# Patient Record
Sex: MALE | Race: WHITE | NOT HISPANIC OR LATINO | Employment: FULL TIME | URBAN - METROPOLITAN AREA
[De-identification: names, ages, dates, MRNs, and addresses within clinical notes are randomized per-mention and may not be internally consistent; named-entity substitution may affect disease eponyms.]

---

## 2017-05-31 ENCOUNTER — OFFICE VISIT (OUTPATIENT)
Dept: FAMILY MEDICINE | Facility: CLINIC | Age: 27
End: 2017-05-31
Payer: COMMERCIAL

## 2017-05-31 VITALS
OXYGEN SATURATION: 97 % | BODY MASS INDEX: 30.27 KG/M2 | TEMPERATURE: 99 F | SYSTOLIC BLOOD PRESSURE: 128 MMHG | HEART RATE: 76 BPM | HEIGHT: 71 IN | DIASTOLIC BLOOD PRESSURE: 82 MMHG | WEIGHT: 216.19 LBS

## 2017-05-31 DIAGNOSIS — F98.8 ATTENTION DEFICIT DISORDER: Primary | ICD-10-CM

## 2017-05-31 PROCEDURE — 99999 PR PBB SHADOW E&M-EST. PATIENT-LVL IV: CPT | Mod: PBBFAC,,, | Performed by: NURSE PRACTITIONER

## 2017-05-31 PROCEDURE — 99213 OFFICE O/P EST LOW 20 MIN: CPT | Mod: S$GLB,,, | Performed by: NURSE PRACTITIONER

## 2017-05-31 RX ORDER — DEXTROAMPHETAMINE SACCHARATE, AMPHETAMINE ASPARTATE MONOHYDRATE, DEXTROAMPHETAMINE SULFATE AND AMPHETAMINE SULFATE 5; 5; 5; 5 MG/1; MG/1; MG/1; MG/1
20 CAPSULE, EXTENDED RELEASE ORAL EVERY MORNING
Qty: 30 CAPSULE | Refills: 0 | Status: SHIPPED | OUTPATIENT
Start: 2017-07-30 | End: 2017-07-11

## 2017-05-31 RX ORDER — DEXTROAMPHETAMINE SACCHARATE, AMPHETAMINE ASPARTATE, DEXTROAMPHETAMINE SULFATE AND AMPHETAMINE SULFATE 5; 5; 5; 5 MG/1; MG/1; MG/1; MG/1
1 TABLET ORAL DAILY
Qty: 30 TABLET | Refills: 0 | Status: SHIPPED | OUTPATIENT
Start: 2017-05-31 | End: 2017-05-31 | Stop reason: SDUPTHER

## 2017-05-31 RX ORDER — DEXTROAMPHETAMINE SACCHARATE, AMPHETAMINE ASPARTATE MONOHYDRATE, DEXTROAMPHETAMINE SULFATE AND AMPHETAMINE SULFATE 5; 5; 5; 5 MG/1; MG/1; MG/1; MG/1
20 CAPSULE, EXTENDED RELEASE ORAL EVERY MORNING
Qty: 30 CAPSULE | Refills: 0 | Status: SHIPPED | OUTPATIENT
Start: 2017-06-30 | End: 2017-05-31 | Stop reason: SDUPTHER

## 2017-05-31 RX ORDER — DEXTROAMPHETAMINE SACCHARATE, AMPHETAMINE ASPARTATE, DEXTROAMPHETAMINE SULFATE AND AMPHETAMINE SULFATE 5; 5; 5; 5 MG/1; MG/1; MG/1; MG/1
1 TABLET ORAL DAILY
Qty: 30 TABLET | Refills: 0 | Status: SHIPPED | OUTPATIENT
Start: 2017-07-30 | End: 2017-07-11

## 2017-05-31 RX ORDER — DEXTROAMPHETAMINE SACCHARATE, AMPHETAMINE ASPARTATE, DEXTROAMPHETAMINE SULFATE AND AMPHETAMINE SULFATE 5; 5; 5; 5 MG/1; MG/1; MG/1; MG/1
1 TABLET ORAL DAILY
Qty: 30 TABLET | Refills: 0 | Status: SHIPPED | OUTPATIENT
Start: 2017-06-30 | End: 2017-05-31 | Stop reason: SDUPTHER

## 2017-05-31 RX ORDER — DEXTROAMPHETAMINE SACCHARATE, AMPHETAMINE ASPARTATE MONOHYDRATE, DEXTROAMPHETAMINE SULFATE AND AMPHETAMINE SULFATE 5; 5; 5; 5 MG/1; MG/1; MG/1; MG/1
20 CAPSULE, EXTENDED RELEASE ORAL EVERY MORNING
Qty: 30 CAPSULE | Refills: 0 | Status: SHIPPED | OUTPATIENT
Start: 2017-05-31 | End: 2017-05-31 | Stop reason: SDUPTHER

## 2017-05-31 NOTE — PROGRESS NOTES
Subjective:       Patient ID: Obed Gardner is a 27 y.o. male.    Chief Complaint: Medication Refill  Pt reports to clinic for medication refill on ADHD medication.  Reports he has not taken medication in approx 1 year due to loss of health insurance.  Notes severe difficulty with concentration.  Starts tasks and can't finish.  Denies having palpitations, insomnia or anorexia while on medication  HPI  Review of Systems   Constitutional: Negative.    HENT: Negative.    Respiratory: Negative.    Cardiovascular: Negative.    Gastrointestinal: Negative.    Genitourinary: Negative.    Musculoskeletal: Negative.    Neurological: Negative.    Psychiatric/Behavioral: Positive for decreased concentration.       Objective:      Physical Exam   Constitutional: He is oriented to person, place, and time. He appears well-developed and well-nourished.   HENT:   Head: Normocephalic.   Eyes: EOM are normal.   Neck: Neck supple.   Cardiovascular: Normal rate and normal heart sounds.    Pulmonary/Chest: Effort normal and breath sounds normal.   Abdominal: Soft. Bowel sounds are normal.   Musculoskeletal: Normal range of motion.   Neurological: He is alert and oriented to person, place, and time.   Skin: Skin is warm and dry.   Psychiatric: He has a normal mood and affect.   Vitals reviewed.      Assessment:       1. Attention deficit disorder        Plan:   Attention deficit disorder  -     Discontinue: dextroamphetamine-amphetamine (ADDERALL) 20 mg tablet; Take 1 tablet by mouth once daily.  Dispense: 30 tablet; Refill: 0  -     Discontinue: dextroamphetamine-amphetamine (ADDERALL XR) 20 MG 24 hr capsule; Take 1 capsule (20 mg total) by mouth every morning.  Dispense: 30 capsule; Refill: 0  -     Discontinue: dextroamphetamine-amphetamine (ADDERALL) 20 mg tablet; Take 1 tablet by mouth once daily.  Dispense: 30 tablet; Refill: 0  -     Discontinue: dextroamphetamine-amphetamine (ADDERALL XR) 20 MG 24 hr capsule; Take 1  capsule (20 mg total) by mouth every morning.  Dispense: 30 capsule; Refill: 0  -     dextroamphetamine-amphetamine (ADDERALL) 20 mg tablet; Take 1 tablet by mouth once daily.  Dispense: 30 tablet; Refill: 0  -     dextroamphetamine-amphetamine (ADDERALL XR) 20 MG 24 hr capsule; Take 1 capsule (20 mg total) by mouth every morning.  Dispense: 30 capsule; Refill: 0  -stable. Follow up in 3 months    No Follow-up on file.

## 2017-07-11 ENCOUNTER — HOSPITAL ENCOUNTER (EMERGENCY)
Facility: HOSPITAL | Age: 27
End: 2017-07-12
Attending: EMERGENCY MEDICINE
Payer: COMMERCIAL

## 2017-07-11 DIAGNOSIS — R45.851 SUICIDAL IDEATION: Primary | ICD-10-CM

## 2017-07-11 DIAGNOSIS — F14.90 COCAINE USE: ICD-10-CM

## 2017-07-11 DIAGNOSIS — F12.20 CANNABIS DEPENDENCE: ICD-10-CM

## 2017-07-11 DIAGNOSIS — F33.9 MAJOR DEPRESSIVE DISORDER, RECURRENT, UNSPECIFIED: ICD-10-CM

## 2017-07-11 DIAGNOSIS — F11.20 OPIOID USE DISORDER, SEVERE, DEPENDENCE: ICD-10-CM

## 2017-07-11 LAB
ALBUMIN SERPL BCP-MCNC: 4 G/DL
ALP SERPL-CCNC: 90 U/L
ALT SERPL W/O P-5'-P-CCNC: 21 U/L
ANION GAP SERPL CALC-SCNC: 9 MMOL/L
APAP SERPL-MCNC: <3 UG/ML
AST SERPL-CCNC: 21 U/L
BASOPHILS # BLD AUTO: 0 K/UL
BASOPHILS NFR BLD: 0 %
BILIRUB SERPL-MCNC: 0.6 MG/DL
BUN SERPL-MCNC: 10 MG/DL
CALCIUM SERPL-MCNC: 9.4 MG/DL
CHLORIDE SERPL-SCNC: 110 MMOL/L
CO2 SERPL-SCNC: 24 MMOL/L
CREAT SERPL-MCNC: 1 MG/DL
DIFFERENTIAL METHOD: ABNORMAL
EOSINOPHIL # BLD AUTO: 0 K/UL
EOSINOPHIL NFR BLD: 0.3 %
ERYTHROCYTE [DISTWIDTH] IN BLOOD BY AUTOMATED COUNT: 12.2 %
EST. GFR  (AFRICAN AMERICAN): >60 ML/MIN/1.73 M^2
EST. GFR  (NON AFRICAN AMERICAN): >60 ML/MIN/1.73 M^2
ETHANOL SERPL-MCNC: <10 MG/DL
GLUCOSE SERPL-MCNC: 148 MG/DL
HCT VFR BLD AUTO: 44.5 %
HGB BLD-MCNC: 16.2 G/DL
LYMPHOCYTES # BLD AUTO: 1.6 K/UL
LYMPHOCYTES NFR BLD: 24 %
MCH RBC QN AUTO: 32.1 PG
MCHC RBC AUTO-ENTMCNC: 36.4 %
MCV RBC AUTO: 88 FL
MONOCYTES # BLD AUTO: 0.4 K/UL
MONOCYTES NFR BLD: 5.7 %
NEUTROPHILS # BLD AUTO: 4.7 K/UL
NEUTROPHILS NFR BLD: 70 %
PLATELET # BLD AUTO: 224 K/UL
PMV BLD AUTO: 9.5 FL
POTASSIUM SERPL-SCNC: 3.7 MMOL/L
PROT SERPL-MCNC: 7.7 G/DL
RBC # BLD AUTO: 5.04 M/UL
SALICYLATES SERPL-MCNC: <5 MG/DL
SODIUM SERPL-SCNC: 143 MMOL/L
TSH SERPL DL<=0.005 MIU/L-ACNC: 0.76 UIU/ML
WBC # BLD AUTO: 6.7 K/UL

## 2017-07-11 PROCEDURE — 85025 COMPLETE CBC W/AUTO DIFF WBC: CPT

## 2017-07-11 PROCEDURE — 80053 COMPREHEN METABOLIC PANEL: CPT

## 2017-07-11 PROCEDURE — 84443 ASSAY THYROID STIM HORMONE: CPT

## 2017-07-11 PROCEDURE — 99242 OFF/OP CONSLTJ NEW/EST SF 20: CPT | Mod: GT,,, | Performed by: PSYCHIATRY & NEUROLOGY

## 2017-07-11 PROCEDURE — 80329 ANALGESICS NON-OPIOID 1 OR 2: CPT

## 2017-07-11 PROCEDURE — 80320 DRUG SCREEN QUANTALCOHOLS: CPT

## 2017-07-11 PROCEDURE — 99285 EMERGENCY DEPT VISIT HI MDM: CPT

## 2017-07-11 PROCEDURE — 80307 DRUG TEST PRSMV CHEM ANLYZR: CPT

## 2017-07-11 RX ORDER — PROMETHAZINE HYDROCHLORIDE 25 MG/1
25 TABLET ORAL EVERY 4 HOURS PRN
Status: DISCONTINUED | OUTPATIENT
Start: 2017-07-12 | End: 2017-07-12

## 2017-07-11 RX ORDER — KETOROLAC TROMETHAMINE 10 MG/1
10 TABLET, FILM COATED ORAL EVERY 6 HOURS PRN
Status: DISCONTINUED | OUTPATIENT
Start: 2017-07-12 | End: 2017-07-12 | Stop reason: HOSPADM

## 2017-07-11 RX ORDER — LOPERAMIDE HYDROCHLORIDE 2 MG/1
2 CAPSULE ORAL 4 TIMES DAILY PRN
Status: DISCONTINUED | OUTPATIENT
Start: 2017-07-12 | End: 2017-07-12 | Stop reason: HOSPADM

## 2017-07-11 RX ADMIN — KETOROLAC TROMETHAMINE 10 MG: 10 TABLET, FILM COATED ORAL at 11:07

## 2017-07-12 VITALS
BODY MASS INDEX: 28 KG/M2 | SYSTOLIC BLOOD PRESSURE: 126 MMHG | DIASTOLIC BLOOD PRESSURE: 80 MMHG | TEMPERATURE: 98 F | HEIGHT: 71 IN | OXYGEN SATURATION: 99 % | RESPIRATION RATE: 18 BRPM | WEIGHT: 200 LBS | HEART RATE: 71 BPM

## 2017-07-12 LAB
AMPHET+METHAMPHET UR QL: NEGATIVE
BARBITURATES UR QL SCN>200 NG/ML: NEGATIVE
BENZODIAZ UR QL SCN>200 NG/ML: NEGATIVE
BILIRUB UR QL STRIP: ABNORMAL
BZE UR QL SCN: NORMAL
CANNABINOIDS UR QL SCN: NORMAL
CLARITY UR: CLEAR
COLOR UR: YELLOW
CREAT UR-MCNC: 363.3 MG/DL
GLUCOSE UR QL STRIP: NEGATIVE
HGB UR QL STRIP: NEGATIVE
KETONES UR QL STRIP: ABNORMAL
LEUKOCYTE ESTERASE UR QL STRIP: NEGATIVE
METHADONE UR QL SCN>300 NG/ML: NEGATIVE
NITRITE UR QL STRIP: NEGATIVE
OPIATES UR QL SCN: NEGATIVE
PCP UR QL SCN>25 NG/ML: NEGATIVE
PH UR STRIP: 6 [PH] (ref 5–8)
PROT UR QL STRIP: ABNORMAL
SP GR UR STRIP: 1.02 (ref 1–1.03)
TOXICOLOGY INFORMATION: NORMAL
URN SPEC COLLECT METH UR: ABNORMAL
UROBILINOGEN UR STRIP-ACNC: 1 EU/DL

## 2017-07-12 PROCEDURE — 81003 URINALYSIS AUTO W/O SCOPE: CPT

## 2017-07-12 PROCEDURE — 25000003 PHARM REV CODE 250: Performed by: EMERGENCY MEDICINE

## 2017-07-12 PROCEDURE — 80307 DRUG TEST PRSMV CHEM ANLYZR: CPT

## 2017-07-12 RX ORDER — DICYCLOMINE HYDROCHLORIDE 10 MG/1
10 CAPSULE ORAL EVERY 6 HOURS PRN
Status: DISCONTINUED | OUTPATIENT
Start: 2017-07-12 | End: 2017-07-12 | Stop reason: HOSPADM

## 2017-07-12 RX ORDER — ZOLPIDEM TARTRATE 5 MG/1
10 TABLET ORAL NIGHTLY PRN
Status: DISCONTINUED | OUTPATIENT
Start: 2017-07-12 | End: 2017-07-12

## 2017-07-12 RX ORDER — TRAZODONE HYDROCHLORIDE 50 MG/1
50 TABLET ORAL NIGHTLY PRN
Status: DISCONTINUED | OUTPATIENT
Start: 2017-07-12 | End: 2017-07-12 | Stop reason: HOSPADM

## 2017-07-12 RX ORDER — CLONIDINE 0.2 MG/24H
1 PATCH, EXTENDED RELEASE TRANSDERMAL
Status: DISCONTINUED | OUTPATIENT
Start: 2017-07-12 | End: 2017-07-12 | Stop reason: HOSPADM

## 2017-07-12 RX ORDER — PROMETHAZINE HYDROCHLORIDE 25 MG/1
25 TABLET ORAL EVERY 4 HOURS PRN
Status: DISCONTINUED | OUTPATIENT
Start: 2017-07-12 | End: 2017-07-12 | Stop reason: HOSPADM

## 2017-07-12 RX ORDER — BACLOFEN 10 MG/1
10 TABLET ORAL 3 TIMES DAILY PRN
Status: DISCONTINUED | OUTPATIENT
Start: 2017-07-12 | End: 2017-07-12 | Stop reason: HOSPADM

## 2017-07-12 RX ORDER — GABAPENTIN 300 MG/1
300 CAPSULE ORAL 3 TIMES DAILY
Status: DISCONTINUED | OUTPATIENT
Start: 2017-07-12 | End: 2017-07-12 | Stop reason: HOSPADM

## 2017-07-12 RX ORDER — HYDROXYZINE PAMOATE 25 MG/1
25 CAPSULE ORAL EVERY 6 HOURS PRN
Status: DISCONTINUED | OUTPATIENT
Start: 2017-07-12 | End: 2017-07-12 | Stop reason: HOSPADM

## 2017-07-12 RX ORDER — ONDANSETRON 4 MG/1
4 TABLET, ORALLY DISINTEGRATING ORAL EVERY 6 HOURS PRN
Status: DISCONTINUED | OUTPATIENT
Start: 2017-07-12 | End: 2017-07-12 | Stop reason: HOSPADM

## 2017-07-12 RX ADMIN — ZOLPIDEM TARTRATE 10 MG: 5 TABLET, FILM COATED ORAL at 01:07

## 2017-07-12 RX ADMIN — CLONIDINE 1 PATCH: 0.2 PATCH TRANSDERMAL at 05:07

## 2017-07-12 RX ADMIN — HYDROXYZINE PAMOATE 25 MG: 25 CAPSULE ORAL at 03:07

## 2017-07-12 RX ADMIN — GABAPENTIN 300 MG: 300 CAPSULE ORAL at 05:07

## 2017-07-12 NOTE — ED PROVIDER NOTES
SCRIBE #1 NOTE: I, Dalia Rodriguez, am scribing for, and in the presence of, Laura De La Rosa DO. I have scribed the entire note.      History      Chief Complaint   Patient presents with    Suicide Attempt     reports SI x 1 month and suicide attempt today by hanging himself        Review of patient's allergies indicates:  No Known Allergies     HPI   HPI    7/11/2017, 10:59 PM   History obtained from the patient      History of Present Illness: Obed Gardner is a 27 y.o. male patient who presents to the Emergency Department for suicidal ideation which onset gradually 1 month ago. Symptoms are intermittent and moderate in severity. Pt reports that he abuses oxycodone daily. Pt states that whenever he doesn't have his pain medicine he starts to feel depressed. He notes that he takes about 60 mg a day. Pt states that he also used marijuana and cocaine yesterday. Pt reports that he ran out yesterday, and began to feel depressed this morning. Pt states that around 1 PM he attempted to hang himself with an extension cord. Pt reports that he did not step down from the stool he was standing on. Pt denies any trouble swallowing or neck pain at this time. No mitigating or exacerbating factors reported.  Patient denies any homicidal ideation, sleep disturbance, EtOH use, IV drug use, SOB, CP,  and all other sxs at this time. No further complaints or concerns at this time.         Arrival mode: Personal vehicle    PCP: Primary Doctor No       Past Medical History:  Past Medical History:   Diagnosis Date    ADHD        Past Surgical History:  History reviewed. No pertinent surgical history.      Family History:  History reviewed. No pertinent family history.    Social History:  Social History     Social History Main Topics    Smoking status: Current Every Day Smoker     Packs/day: 2.00    Smokeless tobacco: Never Used    Alcohol use No    Drug use:      Types: Cocaine, Marijuana      Comment: opiates    Sexual  activity: Yes     Partners: Female       ROS   Review of Systems   Constitutional: Negative for fever.   HENT: Negative for sore throat.    Respiratory: Negative for shortness of breath.    Cardiovascular: Negative for chest pain.   Gastrointestinal: Negative for nausea.   Genitourinary: Negative for dysuria.   Musculoskeletal: Negative for back pain.   Skin: Negative for rash.   Neurological: Negative for weakness.   Hematological: Does not bruise/bleed easily.   Psychiatric/Behavioral: Positive for suicidal ideas. Negative for agitation, confusion and sleep disturbance.       Physical Exam      Initial Vitals [07/11/17 2138]   BP Pulse Resp Temp SpO2   (!) 160/106 102 20 98.1 °F (36.7 °C) 100 %      MAP       124          Physical Exam  Nursing Notes and Vital Signs Reviewed.  Constitutional: Patient is in no apparent distress. Well-developed and well-nourished.  Head: Atraumatic. Normocephalic.  Eyes: PERRL. EOM intact. Conjunctivae are not pale. No scleral icterus.  ENT: Mucous membranes are moist. Oropharynx is clear and symmetric.    Neck: Supple. Full ROM. No lymphadenopathy.  Cardiovascular: Regular rate. Regular rhythm. No murmurs, rubs, or gallops. Distal pulses are 2+ and symmetric.  Pulmonary/Chest: No respiratory distress. Clear to auscultation bilaterally. No wheezing, rales, or rhonchi.  Abdominal: Soft and non-distended.  There is no tenderness.  No rebound, guarding, or rigidity. Good bowel sounds.  Genitourinary: No CVA tenderness  Musculoskeletal: Moves all extremities. No obvious deformities. No edema. No calf tenderness.  Skin: Warm and dry.  Neurological:  Alert, awake, and appropriate.  Normal speech.  No acute focal neurological deficits are appreciated.  Psychiatric:               Behavior: normal, cooperative              Mood and Affect: flat affect              Thought Process: within normal limits              Suicidal Ideations: No              Suicidal Plan: Specific plan to harm self.  " Hanging.               Homicidal Ideations: No              Hallucinations: none      ED Course    Procedures  ED Vital Signs:  Vitals:    07/11/17 2138 07/11/17 2310 07/12/17 0021 07/12/17 0217   BP: (!) 160/106 (!) 139/90 (!) 149/95 122/74   Pulse: 102 83 94 81   Resp: 20 18 18 18   Temp: 98.1 °F (36.7 °C) 98.1 °F (36.7 °C) 98.3 °F (36.8 °C) 97.9 °F (36.6 °C)   TempSrc: Oral Oral Oral Oral   SpO2: 100% 99% 95% 98%   Weight: 90.7 kg (200 lb)      Height: 5' 11" (1.803 m)          Abnormal Lab Results:  Labs Reviewed   CBC W/ AUTO DIFFERENTIAL - Abnormal; Notable for the following:        Result Value    MCH 32.1 (*)     MCHC 36.4 (*)     All other components within normal limits   COMPREHENSIVE METABOLIC PANEL - Abnormal; Notable for the following:     Glucose 148 (*)     All other components within normal limits   URINALYSIS - Abnormal; Notable for the following:     Protein, UA Trace (*)     Ketones, UA Trace (*)     Bilirubin (UA) 1+ (*)     All other components within normal limits   ACETAMINOPHEN LEVEL - Abnormal; Notable for the following:     Acetaminophen (Tylenol), Serum <3.0 (*)     All other components within normal limits   SALICYLATE LEVEL - Abnormal; Notable for the following:     Salicylate Lvl <5.0 (*)     All other components within normal limits   TSH   DRUG SCREEN PANEL, URINE EMERGENCY   ALCOHOL,MEDICAL (ETHANOL)        All Lab Results:  Results for orders placed or performed during the hospital encounter of 07/11/17   CBC auto differential   Result Value Ref Range    WBC 6.70 3.90 - 12.70 K/uL    RBC 5.04 4.60 - 6.20 M/uL    Hemoglobin 16.2 14.0 - 18.0 g/dL    Hematocrit 44.5 40.0 - 54.0 %    MCV 88 82 - 98 fL    MCH 32.1 (H) 27.0 - 31.0 pg    MCHC 36.4 (H) 32.0 - 36.0 %    RDW 12.2 11.5 - 14.5 %    Platelets 224 150 - 350 K/uL    MPV 9.5 9.2 - 12.9 fL    Gran # 4.7 1.8 - 7.7 K/uL    Lymph # 1.6 1.0 - 4.8 K/uL    Mono # 0.4 0.3 - 1.0 K/uL    Eos # 0.0 0.0 - 0.5 K/uL    Baso # 0.00 0.00 - 0.20 " K/uL    Gran% 70.0 38.0 - 73.0 %    Lymph% 24.0 18.0 - 48.0 %    Mono% 5.7 4.0 - 15.0 %    Eosinophil% 0.3 0.0 - 8.0 %    Basophil% 0.0 0.0 - 1.9 %    Differential Method Automated    Comprehensive metabolic panel   Result Value Ref Range    Sodium 143 136 - 145 mmol/L    Potassium 3.7 3.5 - 5.1 mmol/L    Chloride 110 95 - 110 mmol/L    CO2 24 23 - 29 mmol/L    Glucose 148 (H) 70 - 110 mg/dL    BUN, Bld 10 6 - 20 mg/dL    Creatinine 1.0 0.5 - 1.4 mg/dL    Calcium 9.4 8.7 - 10.5 mg/dL    Total Protein 7.7 6.0 - 8.4 g/dL    Albumin 4.0 3.5 - 5.2 g/dL    Total Bilirubin 0.6 0.1 - 1.0 mg/dL    Alkaline Phosphatase 90 55 - 135 U/L    AST 21 10 - 40 U/L    ALT 21 10 - 44 U/L    Anion Gap 9 8 - 16 mmol/L    eGFR if African American >60 >60 mL/min/1.73 m^2    eGFR if non African American >60 >60 mL/min/1.73 m^2   TSH   Result Value Ref Range    TSH 0.758 0.400 - 4.000 uIU/mL   Urinalysis - clean catch   Result Value Ref Range    Specimen UA Urine, Clean Catch     Color, UA Yellow Yellow, Straw, Kathleen    Appearance, UA Clear Clear    pH, UA 6.0 5.0 - 8.0    Specific Gravity, UA 1.025 1.005 - 1.030    Protein, UA Trace (A) Negative    Glucose, UA Negative Negative    Ketones, UA Trace (A) Negative    Bilirubin (UA) 1+ (A) Negative    Occult Blood UA Negative Negative    Nitrite, UA Negative Negative    Urobilinogen, UA 1.0 <2.0 EU/dL    Leukocytes, UA Negative Negative   Drug screen panel, emergency   Result Value Ref Range    Benzodiazepines Negative     Methadone metabolites Negative     Cocaine (Metab.) Presumptive Positive     Opiate Scrn, Ur Negative     Barbiturate Screen, Ur Negative     Amphetamine Screen, Ur Negative     THC Presumptive Positive     Phencyclidine Negative     Creatinine, Random Ur 363.3 23.0 - 375.0 mg/dL    Toxicology Information SEE COMMENT    Ethanol   Result Value Ref Range    Alcohol, Medical, Serum <10 <10 mg/dL   Acetaminophen level   Result Value Ref Range    Acetaminophen (Tylenol), Serum  <3.0 (L) 10.0 - 20.0 ug/mL   Salicylate level   Result Value Ref Range    Salicylate Lvl <5.0 (L) 15.0 - 30.0 mg/dL         Imaging Results:  Imaging Results    None                 The Emergency Provider reviewed the vital signs and test results, which are outlined above.    ED Discussion       10:45 PM: The PEC hold has been issued by Dr. Cortez at this time for suicide attempt and suicidal ideation.    2:59 AM: Pt has been medically cleared by Dr. De La Rosa at this time. Reassessed pt at this time. Pt is resting comfortably and appears in no acute distress. There are no psychiatric services offered at this facility. Patient requests inpatient psychiatric services. D/w pt all pertinent ED information and plan to transfer to psychiatric facility for psychiatric treatment. Pt verbalizes understanding. Patient being transferred by [facility] for ongoing personal protection en route. Pt will be transported by personnel trained in CPR and CPI.  Risks:  MVC, loss of vitals, resulting in death, or permanent disability.   Benefits:  Inpatient psychiatric care. All questions and complaints have been addressed at this time. Pt condition is stable at this time and is clear to transfer to psychiatric facility at this time. Patient verbalized understanding.     3:09 AM   Accepting Facility: [facility]   Accepting Physician: Dr. Finney        ED Medication(s):  Medications   ketorolac tablet 10 mg (10 mg Oral Given 7/11/17 1891)   loperamide capsule 2 mg (not administered)   cloNIDine 0.2 mg/24 hr td ptwk 1 patch (not administered)   dicyclomine capsule 10 mg (not administered)   ondansetron disintegrating tablet 4 mg (not administered)   baclofen tablet 10 mg (not administered)   hydrOXYzine pamoate capsule 25 mg (not administered)   trazodone tablet 50 mg (not administered)   gabapentin capsule 300 mg (not administered)   promethazine tablet 25 mg (not administered)       New Prescriptions    No medications on file              Medical Decision Making              Scribe Attestation:   Scribe #1: I performed the above scribed service and the documentation accurately describes the services I performed. I attest to the accuracy of the note.    Attending:   Physician Attestation Statement for Scribe #1: I, Laura De La Rosa DO, personally performed the services described in this documentation, as scribed by Dalia Rodriguez, in my presence, and it is both accurate and complete.          Clinical Impression       ICD-10-CM ICD-9-CM   1. Suicidal ideation R45.851 V62.84   2. Major depressive disorder, recurrent, unspecified F33.9 296.30   3. Opioid use disorder, severe, dependence F11.20 304.00   4. Cannabis dependence F12.20 304.30   5. Cocaine use F14.10 305.60       Disposition:   Disposition: Transferred  Condition: Stable         Laura De La Rosa DO  07/12/17 0422

## 2017-07-12 NOTE — ED NOTES
BELONGINGS IN XRAY ROOM LOCKER 1  Suitcase with 4 packs of cigarettes, 3 pair of underwear, 4 pair of shorts, 3 pair of socks, 3 shirts, 1 pair of tennis shoes with no laces, toothpaste, toothbrush, deoderant

## 2017-07-12 NOTE — CONSULTS
"Tele-Consultation to Emergency Department from Psychiatry      Patient agreeable to consultation via telepsychiatry.    Consultation started: 7/11/2017 at 2345  The chief complaint leading to psychiatric consultation is: Suicide attempt, substance abuse  This consultation was requested by Dr. De La Rosa, the Emergency Department attending physician.  The location of the consulting psychiatrist is [facility] [place], [place].  The patient location is [facility].  The patient arrived at the ED at: 2130    Also present with the patient at the time of the consultation: ED Staff    Patient Identification:  Obed Gardner is a 27 y.o. male.    Patient information was obtained from patient.  Patient presented voluntarily to the Emergency Department by private vehicle.    History of Present Illness:  26 y/o WM who presented to the ED after an aborted suicide attempt via hanging.  He had an electrical cord around his neck and states he was standing on the stool.  He endorsed worsening depression over this past month with suicidal ideations.  These symptoms worsened in the context of abusing oxycodone, cocaine, and marijuana.  He has acute decompensation in mood after running out of his opiates yesterday.  He states he uses 60-70mg daily, via snorting.  He develops opiate withdrawal symptoms 1-2 days afterwards.  He currently endorses stomach cramps, and body aches.  He used cocaine after "a long time, only because I was desperate to use something"  He smokes marijuana daily for years.  He c/o poor sleep, helpless/hopeless feelings, poor appetite, and sad mood.  He denies any psychosis or nina.  He states he has been depressed but this is the worst it has ever been.      Psychiatric History:   Hospitalization: No  Medication Trials: Yes, Viibryd and Adderall,  Suboxone  Suicide Attempts: yes, this is his first attempt  Violence: No  Depression: Yes  Nina: No  AH's: No  Delusions: No    Review of " "Systems:  Negative except psychiatric    Past Medical History:   Past Medical History:   Diagnosis Date    ADHD         Seizures: No  Head trauma/l.o.c.: No  Wish to become pregnant[if female of childbearing age]: n/a    Allergies:   Review of patient's allergies indicates:  No Known Allergies    Medications in ER:   Medications   promethazine tablet 25 mg (not administered)   ketorolac tablet 10 mg (10 mg Oral Given 7/11/17 2322)   loperamide capsule 2 mg (not administered)       Medications at home: Was on Adderall but not currently    Substance Abuse History:   Alchohol: Denies  Drug: Opiates, cocaine, THC     Legal History:   Past charges/incarcerations: [place]  Pending charges: Denies    Family Psychiatric History:   States there are family members who committed suicide and have opiate addiction  Social History:  History of Physical/Sexual Abuse: Did not ask  Employment/Disability: Works as  for printing company  Relationship Status/Sexual Orientation: Hetero,   Children: 2 boys  Housing Status: Lives with wife, two sons, and mother in law   History: No       Current Evaluation:     Constitutional  Vitals:  Vitals:    07/11/17 2138 07/11/17 2310   BP: (!) 160/106 (!) 139/90   Pulse: 102 83   Resp: 20 18   Temp: 98.1 °F (36.7 °C) 98.1 °F (36.7 °C)   TempSrc: Oral Oral   SpO2: 100% 99%   Weight: 90.7 kg (200 lb)    Height: 5' 11" (1.803 m)       General:  unremarkable, age appropriate     Musculoskeletal  Muscle Strength/Tone:   moving arms normally   Gait & Station:   sitting on stretcher     Psychiatric  Level of Consciousness: alert  Orientation: oriented to person, place and time  Grooming: in hospital gown  Psychomotor Behavior: no agitation  Speech: normal in rate, rhythm and volume  Language: uses words appropriately  Mood: Depressed  Affect: Congruent  Thought Process: coherent  Associations: intact  Thought Content: No AVH/HI, +SI with attempt, no delusions  Memory: " Intact  Attention: intact to interview  Fund of Knowledge: appears adequate  Insight: Fair  Judgement: Poor    Relevant Elements of Neurological Exam: no abnormality of posture noted    Assessment - Diagnosis - Goals:     Diagnosis/Impression: Major Depression, Unspecified  Opiate Use Disorder, severe, in withdrawal  Cannabis Use Disorder, severe, uncomplicated  Cocaine Use Disorder, mild, episodic    Rec: I agree with placing a PEC on this patient due to recent suicide attempt.  He is in need of inpatient psychiatric admission and is currently a danger to self.  He will benefit from inpatient detox off of opiates as well being monitored for safety.  His depressive symptoms are exacerbated by opiate withdrawal syndrome.  Start patient on COWS (clinical opiate withdrawal scale).  Anti depressant initiation deferred to accepting / treating psychiatrist.  For now if any opiate withdrawal symptoms develop in the context of awaiting placement, consider these  Recommended medications:  1) Clonidine 0.2mg patch or 1mg po tid  2) Dicyclomine 10mg po q4 prn abdominal cramps  3) Ondansetron 4mg SL q6 PRN nausea  4) Baclofen 10mg po tid PRN muscle cramps  5) Vistaril 25mg po q 4 hours prn anxiety  6) Trazodone 50mg po qhs prn sleep  7) Gabapentin 300 mg po tid    Time with patient: 30m    More than 50% of the time was spent counseling/coordinating care    Laboratory Data:   Labs Reviewed   CBC W/ AUTO DIFFERENTIAL - Abnormal; Notable for the following:        Result Value    MCH 32.1 (*)     MCHC 36.4 (*)     All other components within normal limits   COMPREHENSIVE METABOLIC PANEL - Abnormal; Notable for the following:     Glucose 148 (*)     All other components within normal limits   ACETAMINOPHEN LEVEL - Abnormal; Notable for the following:     Acetaminophen (Tylenol), Serum <3.0 (*)     All other components within normal limits   SALICYLATE LEVEL - Abnormal; Notable for the following:     Salicylate Lvl <5.0 (*)     All  other components within normal limits   TSH   ALCOHOL,MEDICAL (ETHANOL)   URINALYSIS   DRUG SCREEN PANEL, URINE EMERGENCY         Consulting clinician was informed of the encounter and consult note.    Consultation ended: 7/11/2017 at **

## 2017-07-12 NOTE — ED NOTES
Spoke with Corinne at [facility] who states she will get an update on transport and call me back in a few minutes.

## 2017-07-12 NOTE — ED NOTES
Acceptance at [facility] [address], [place], [place]. Accepting Dr Finney. Call report to [phone]

## 2017-07-12 NOTE — ED NOTES
Pt states he has been unable to go back to sleep since being awakened and is feeling very anxious. Pt requesting something for anxiety.

## 2017-07-12 NOTE — ED NOTES
Spoke with Logan at [facility] who states he will page psychiatrist at this time.

## 2017-07-12 NOTE — ED NOTES
Pt presents to ED after a suicide attempt by hanging himself. Pt states that he has been having SI for a while but it has increased recently. States that a contributing factor to SI is his drug addiction and relapses. Pt admits to opiate, marijuana, and cocaine use. Pt denies alcohol use.

## 2017-07-12 NOTE — ED NOTES
ACKNOWLEDGMENT OF RIGHTS SIGNED & PLACED ON CHART.     Refuses safe word.    PEC process explained to pt. Educated on visiting hours. Verbalizes understanding of POC. Pt remains in grey gown & yellow non slip socks. Jeremy at bedside for direct observation. NAD noted. Denies any other needs at the time. Will continue to monitor.    Sandra Gardner (mother): [phone]

## 2017-07-18 ENCOUNTER — OFFICE VISIT (OUTPATIENT)
Dept: FAMILY MEDICINE | Facility: CLINIC | Age: 27
End: 2017-07-18
Payer: COMMERCIAL

## 2017-07-18 ENCOUNTER — TELEPHONE (OUTPATIENT)
Dept: FAMILY MEDICINE | Facility: CLINIC | Age: 27
End: 2017-07-18

## 2017-07-18 VITALS
BODY MASS INDEX: 27.24 KG/M2 | HEIGHT: 71 IN | DIASTOLIC BLOOD PRESSURE: 100 MMHG | SYSTOLIC BLOOD PRESSURE: 150 MMHG | TEMPERATURE: 98 F | OXYGEN SATURATION: 99 % | WEIGHT: 194.56 LBS | HEART RATE: 126 BPM

## 2017-07-18 DIAGNOSIS — F32.3 SEVERE SINGLE CURRENT EPISODE OF MAJOR DEPRESSIVE DISORDER, WITH PSYCHOTIC FEATURES: Primary | ICD-10-CM

## 2017-07-18 DIAGNOSIS — F11.10 OPIOID ABUSE: ICD-10-CM

## 2017-07-18 DIAGNOSIS — T14.91XA SUICIDAL BEHAVIOR WITH ATTEMPTED SELF-INJURY: ICD-10-CM

## 2017-07-18 PROCEDURE — 99999 PR PBB SHADOW E&M-EST. PATIENT-LVL III: CPT | Mod: PBBFAC,,, | Performed by: FAMILY MEDICINE

## 2017-07-18 PROCEDURE — 99214 OFFICE O/P EST MOD 30 MIN: CPT | Mod: S$GLB,,, | Performed by: FAMILY MEDICINE

## 2017-07-18 RX ORDER — FLUOXETINE HYDROCHLORIDE 20 MG/1
20 CAPSULE ORAL DAILY
COMMUNITY
End: 2017-09-06

## 2017-07-18 RX ORDER — HYDROXYZINE PAMOATE 50 MG/1
50 CAPSULE ORAL EVERY 8 HOURS PRN
Qty: 20 CAPSULE | Refills: 0 | Status: SHIPPED | OUTPATIENT
Start: 2017-07-18 | End: 2017-09-06

## 2017-07-18 NOTE — PROGRESS NOTES
"Subjective:       Patient ID: Obed Gardner is a 27 y.o. male.    Chief Complaint: Anxiety    27 y old male with hx of opioids abuse, marihuana abuse and depression here for f.u on Hosp due to suicidal attempts .He tried to hang himself with Extension cord but he became afraid of stepping of stool  .  Hospitalized  and [facility] . Started on Prozac . He was under the care of dr Finney. He has renetta taking opioids for  Over 10 y , he had to borrow money  And got overwhelmed when he realized he had bills to pay , he owned money  Etc . He works for a printing company . Lives with wife and children,. Denies any IV drug use. He just used Cocaine day of suicidal attempt  To try to cope . Ready to quit using illegal substances  . He was " detox" at Inpatient psych facility . Will be enrolling  himself  At the [facility] addiction center .   He feels overwhelmed , worried, not sleeping much . No suicidal thoughts       Anxiety   Symptoms include nervous/anxious behavior. Patient reports no confusion, decreased concentration or suicidal ideas.         Review of Systems   Constitutional: Negative.    HENT: Negative.    Respiratory: Negative.    Cardiovascular: Negative.    Gastrointestinal: Negative.    Psychiatric/Behavioral: Positive for sleep disturbance. Negative for agitation, behavioral problems, confusion, decreased concentration, dysphoric mood, hallucinations, self-injury and suicidal ideas. The patient is nervous/anxious.        Objective:      Physical Exam   Constitutional: He is oriented to person, place, and time. He appears well-developed and well-nourished. No distress.   HENT:   Head: Normocephalic and atraumatic.   Right Ear: External ear normal.   Left Ear: External ear normal.   Nose: Nose normal.   Mouth/Throat: No oropharyngeal exudate.   Eyes: Conjunctivae and EOM are normal. Pupils are equal, round, and reactive to light. Right eye exhibits no discharge. Left eye exhibits no " discharge. No scleral icterus.   Neck: Normal range of motion. Neck supple. No JVD present. No tracheal deviation present. No thyromegaly present.   Cardiovascular: Normal rate, regular rhythm, normal heart sounds and intact distal pulses.  Exam reveals no gallop and no friction rub.    No murmur heard.  Pulmonary/Chest: Effort normal and breath sounds normal. No stridor. No respiratory distress. He has no wheezes. He has no rales. He exhibits no tenderness.   Abdominal: Soft. Bowel sounds are normal. He exhibits no distension. There is no tenderness. There is no rebound and no guarding.   Musculoskeletal: Normal range of motion. He exhibits no edema or tenderness.   Lymphadenopathy:     He has no cervical adenopathy.   Neurological: He is alert and oriented to person, place, and time. He has normal reflexes. He displays normal reflexes. No cranial nerve deficit. He exhibits normal muscle tone. Coordination normal.   Skin: Skin is warm and dry. No rash noted. He is not diaphoretic. No erythema. No pallor.   Psychiatric: His speech is normal and behavior is normal. Judgment and thought content normal. His mood appears anxious. He exhibits a depressed mood.       Assessment:      Obed was seen today for anxiety.    Diagnoses and all orders for this visit:    Severe single current episode of major depressive disorder, with psychotic features  -     Ambulatory consult to Psychiatry    Suicidal behavior with attempted self-injury    Opioid abuse    Other orders  -     hydrOXYzine pamoate (VISTARIL) 50 MG Cap; Take 1 capsule (50 mg total) by mouth every 8 (eight) hours as needed.      Plan:   See psych . CASH contact signed . Cont prozac   Will return in 1 w for nurse visit given his elevated BP   recs form hospitalization requested

## 2017-07-20 ENCOUNTER — TELEPHONE (OUTPATIENT)
Dept: FAMILY MEDICINE | Facility: CLINIC | Age: 27
End: 2017-07-20

## 2018-12-28 ENCOUNTER — OFFICE VISIT (OUTPATIENT)
Dept: FAMILY MEDICINE | Facility: CLINIC | Age: 28
End: 2018-12-28
Payer: COMMERCIAL

## 2018-12-28 VITALS
WEIGHT: 217.13 LBS | TEMPERATURE: 97 F | OXYGEN SATURATION: 99 % | HEART RATE: 89 BPM | BODY MASS INDEX: 30.29 KG/M2 | DIASTOLIC BLOOD PRESSURE: 84 MMHG | SYSTOLIC BLOOD PRESSURE: 135 MMHG

## 2018-12-28 DIAGNOSIS — F32.1 CURRENT MODERATE EPISODE OF MAJOR DEPRESSIVE DISORDER WITHOUT PRIOR EPISODE: Primary | ICD-10-CM

## 2018-12-28 DIAGNOSIS — F11.10 OPIOID ABUSE: ICD-10-CM

## 2018-12-28 DIAGNOSIS — F19.10 SUBSTANCE ABUSE: ICD-10-CM

## 2018-12-28 PROCEDURE — 3008F BODY MASS INDEX DOCD: CPT | Mod: CPTII,S$GLB,, | Performed by: FAMILY MEDICINE

## 2018-12-28 PROCEDURE — 99214 OFFICE O/P EST MOD 30 MIN: CPT | Mod: S$GLB,,, | Performed by: FAMILY MEDICINE

## 2018-12-28 PROCEDURE — 99999 PR PBB SHADOW E&M-EST. PATIENT-LVL III: CPT | Mod: PBBFAC,,, | Performed by: FAMILY MEDICINE

## 2018-12-28 NOTE — PROGRESS NOTES
"Subjective:       Patient ID: Obed Gardner is a 28 y.o. male.    Chief Complaint: Depression     28 y old male with hx of illegal drug abuse, depression and add here wanting to resume taking lexapro  . He stopped it 4 y ago .Last heroin use was 4 m ago ( snorted)  , now with racing thoughts , paranoia about  Being fired , wife being unfaithful etc  .  Employed at \[employer]\"" . He  Just completed a 12 w program at  addiction center .  Suicidal thoughts about 1 m ago , not a formal plan , he just did not"want to be here anymore "       Review of Systems   Constitutional: Negative.    HENT: Negative.    Eyes: Negative.    Respiratory: Negative.    Cardiovascular: Negative.    Gastrointestinal: Negative.    Genitourinary: Negative.    Musculoskeletal: Negative.    Skin: Negative.    Hematological: Negative.        Objective:      Physical Exam   Constitutional: He is oriented to person, place, and time. He appears well-developed and well-nourished. No distress.   HENT:   Head: Normocephalic and atraumatic.   Right Ear: External ear normal.   Left Ear: External ear normal.   Nose: Nose normal.   Mouth/Throat: No oropharyngeal exudate.   Eyes: Conjunctivae and EOM are normal. Pupils are equal, round, and reactive to light. Right eye exhibits no discharge. Left eye exhibits no discharge. No scleral icterus.   Neck: Normal range of motion. Neck supple. No JVD present. No tracheal deviation present. No thyromegaly present.   Cardiovascular: Normal rate, regular rhythm, normal heart sounds and intact distal pulses. Exam reveals no gallop and no friction rub.   No murmur heard.  Pulmonary/Chest: Effort normal and breath sounds normal. No stridor. No respiratory distress. He has no wheezes. He has no rales. He exhibits no tenderness.   Abdominal: Soft. Bowel sounds are normal. He exhibits no distension. There is no tenderness. There is no rebound and no guarding.   Musculoskeletal: Normal range of motion. He exhibits no " edema or tenderness.   Lymphadenopathy:     He has no cervical adenopathy.   Neurological: He is alert and oriented to person, place, and time. He has normal reflexes. He displays normal reflexes. No cranial nerve deficit. He exhibits normal muscle tone. Coordination normal.   Skin: Skin is warm and dry. No rash noted. He is not diaphoretic. No erythema. No pallor.   Psychiatric: He has a normal mood and affect. His behavior is normal. Judgment and thought content normal.       Assessment:      Obed was seen today for depression.    Diagnoses and all orders for this visit:    Current moderate episode of major depressive disorder without prior episode  -     Ambulatory consult to Psychiatry    Substance abuse  -     Ambulatory consult to Psychiatry    Opioid abuse      Plan:    Ws discussed.   COPE contact Info was provided   CASH was also signed   He has  Not show up for several psych giancarlo in the past  . Giancarlo schedule for 1/17 with dr Sandoval     Time spent: 25 minutes in face to face discussion concerning diagnosis, prognosis, review of lab and test results, benefits of treatment as well as management of disease, counseling of patient and coordination of care between various health care providers . Greater than half the time spent was used for coordination of care and counseling of patient.

## 2019-01-17 ENCOUNTER — INITIAL CONSULT (OUTPATIENT)
Dept: PSYCHIATRY | Facility: CLINIC | Age: 29
End: 2019-01-17
Attending: FAMILY MEDICINE
Payer: COMMERCIAL

## 2019-01-17 VITALS
WEIGHT: 218.69 LBS | SYSTOLIC BLOOD PRESSURE: 163 MMHG | DIASTOLIC BLOOD PRESSURE: 99 MMHG | BODY MASS INDEX: 30.5 KG/M2 | HEART RATE: 84 BPM

## 2019-01-17 DIAGNOSIS — F41.1 GENERALIZED ANXIETY DISORDER: ICD-10-CM

## 2019-01-17 DIAGNOSIS — F12.11 CANNABIS USE DISORDER, MILD, IN EARLY REMISSION: ICD-10-CM

## 2019-01-17 DIAGNOSIS — F31.9 BIPOLAR AFFECTIVE DISORDER, REMISSION STATUS UNSPECIFIED: Primary | ICD-10-CM

## 2019-01-17 DIAGNOSIS — F11.21 OPIOID DEPENDENCE IN REMISSION: ICD-10-CM

## 2019-01-17 PROCEDURE — 99999 PR PBB SHADOW E&M-EST. PATIENT-LVL II: CPT | Mod: PBBFAC,,, | Performed by: PSYCHIATRY & NEUROLOGY

## 2019-01-17 PROCEDURE — 90792 PR PSYCHIATRIC DIAGNOSTIC EVALUATION W/MEDICAL SERVICES: ICD-10-PCS | Mod: S$GLB,,, | Performed by: PSYCHIATRY & NEUROLOGY

## 2019-01-17 PROCEDURE — 90792 PSYCH DIAG EVAL W/MED SRVCS: CPT | Mod: S$GLB,,, | Performed by: PSYCHIATRY & NEUROLOGY

## 2019-01-17 PROCEDURE — 99999 PR PBB SHADOW E&M-EST. PATIENT-LVL II: ICD-10-PCS | Mod: PBBFAC,,, | Performed by: PSYCHIATRY & NEUROLOGY

## 2019-01-17 NOTE — PROGRESS NOTES
"Outpatient Psychiatry Initial Visit (MD/NP)    1/17/2019    Obed Gardner, a 28 y.o. male, presenting for initial evaluation visit. Met with patient     Reason for Encounter: Consult from Dr. Beach     Chief Complaint: Medication management for "up and down mood" and paranoia        History of Present Illness:   29 yo male with a history of opiate use disorder, currently in early remission.   September 24th last time used: heroin used at the time. He has struggled with addiction for 10 years. Started out with pain pills, saw his brothers taking them, and once tried couldn't stop. He would buy the prescription opiates off the street, and then couldn't get them anymore and then tried heroin, did it about a month. Nearly lost his job, 12 step program [facility] [place] [facility], completed the program. They do not do medication management, they offer counseling. Patient admitted to Smoking Marijuana also in the past. Alcohol is not drug of choice, drinks it only on occassions  Uncle with opiate addiction, committed suicide 2001. Brothers struggled with opiate addiction now they are sober.    Describes that he is up and down, gets things in his head that are not real. Paranoid about people and that they are doing something that would potentially harm him. Feels that people are intentionally doing things to exclude him, and thinks that wife is cheating on him. Very suspicious about people, but only when he is in a certain "mood." Mood a lot of racing negative thoughts: people don't want to be around me, suspicious of others."    He says that if he works every day usually doesn't get sad.     Depression Symptoms: When he gets depressed he says that it feels its really dark, starts having passive SI,     1)Depressed mood most of the day, nearly every day: "I can be fine in the morning and then down, lasts a couple of hours, usually when he is not busy it gets worse.   2) Markedly diminished interest or " "pleasure: yes  3) Significant weight loss when not dieting or weight gain or decrease or increase in appetite nearly every day: decreased appetite  4) Insomnia or hypersomnia nearly every day: both  5) Psychomotor agitation or retardation nearly: varies   6) Fatigue or loss of energy nearly every day: fatigue   7) Feelings of worthlessness or excessive or inappropriate guilt: yes   8) Diminished ability to think or concentrate, or indecisiveness, nearly every day: yes    9) Recurrent thoughts of death (not just fear of dying), recurrent suicidal ideation without a specific plan, or a suicide attempt or a specific plan for committing suicide: yes no plan      Anxiety Symptoms:  Anxiety Disorder Symptoms:      Excessive Anxiety & Worry (occurring more days than not for at least past 6 months) yes   Difficulty in Controlling Worry -- yes  Sleep disturbance -- yes   Restlessness/psychomotor agitation -- yes  Fatigues easily -- no   Difficulty concentrating/mind going blank -- yes  Irritability -- yes  Muscle tension/headaches -- yes  GI somatic complaints (e.g., IBS, frequent dyspepsia) -- yes     Panic Attack symptoms:  ?Sensations of shortness of breath or smothering: historically   ?Feelings of choking: no  ?Chest pain or discomfort : no  ?Nausea or abdominal distress: no   ?Feeling dizzy, unsteady, light-headed, or faint "yes"  ?Chills or heat sensations: "heated sensation"  ?Paresthesias (numbness or tingling sensations): no  ?Derealization (feelings of unreality) or depersonalization (being detached from oneself) : yes   ?Fear of losing control or "going crazy" : yes  ?Fear of dying: no      Social Anxiety   ..On a scale from 0 (not at all) to 4 (extremely), how much the following problems have bothered you during the past week:    ?Fear of embarrassment causes me to avoid doing things or speaking to people. 4  ?I avoid activities in which I am the center of attention 0  ?Being embarrassed or looking stupid are " among my worst fears 0    JORI SCREEN  A.  persistently elevated, expansive, or irritable mood and increased activity or energy, lasting at least one week (or any duration if hospitalization is necessary).  B. During this period three (or more) of the following symptoms (four if the mood is only irritable) are present   1) Inflated self-esteem or grandiosity. Yes (couple of hours)  2) Decreased need for sleep (eg, feels rested after only three hours of sleep): no  3) More talkative than usual or pressure to keep talking. Yes   4) Flight of ideas or subjective experience that thoughts are racing. yes  5) Distractibility yes  6) Increase in goal-directed activity or psychomotor agitation (ie, purposeless non-goal-directed activity). yes  7) Excessive involvement in activities that have a high potential for painful consequences  unrestrained buying sprees, sexual indiscretions). Yes        Psychosis: possible paranoia of people trying to conspire against him or of wife being unfaithful    Review Of Systems:     Pertinent items are noted in HPI.    Current Evaluation:         Constitutional  General:   Well groomed, age appropriate     Musculoskeletal  Gait & Station:   non ataxic     Psychiatric  Speech:   clear and coherent, regular rate and rhythm   Mood & Affect:  Congruent affect, mood anxious and suspicious although warms up with time, anxious mood   Thought Process:  Linear, logical, goal directed   Thought Content:  Some paranoid delusions, no auditory or visual hallucinations, no si or hi   Insight:  fair   Judgement: intact   Orientation:  Oriented to time, place, person, and situation   Memory: Intact for both recent and remote as evidenced by 3/3 object recall   Language: Intact   Attention Span & Concentration:  Intact to conversation. Capable of doing days of the week backwards   Fund of Knowledge:  Adequate for age and education level       Relevant Elements of Neurological Exam: normal  gait      Laboratory Data  No visits with results within 1 Month(s) from this visit.   Latest known visit with results is:   Admission on 07/11/2017, Discharged on 07/12/2017   Component Date Value Ref Range Status    WBC 07/11/2017 6.70  3.90 - 12.70 K/uL Final    RBC 07/11/2017 5.04  4.60 - 6.20 M/uL Final    Hemoglobin 07/11/2017 16.2  14.0 - 18.0 g/dL Final    Hematocrit 07/11/2017 44.5  40.0 - 54.0 % Final    MCV 07/11/2017 88  82 - 98 fL Final    MCH 07/11/2017 32.1* 27.0 - 31.0 pg Final    MCHC 07/11/2017 36.4* 32.0 - 36.0 % Final    RDW 07/11/2017 12.2  11.5 - 14.5 % Final    Platelets 07/11/2017 224  150 - 350 K/uL Final    MPV 07/11/2017 9.5  9.2 - 12.9 fL Final    Gran # (ANC) 07/11/2017 4.7  1.8 - 7.7 K/uL Final    Lymph # 07/11/2017 1.6  1.0 - 4.8 K/uL Final    Mono # 07/11/2017 0.4  0.3 - 1.0 K/uL Final    Eos # 07/11/2017 0.0  0.0 - 0.5 K/uL Final    Baso # 07/11/2017 0.00  0.00 - 0.20 K/uL Final    Gran% 07/11/2017 70.0  38.0 - 73.0 % Final    Lymph% 07/11/2017 24.0  18.0 - 48.0 % Final    Mono% 07/11/2017 5.7  4.0 - 15.0 % Final    Eosinophil% 07/11/2017 0.3  0.0 - 8.0 % Final    Basophil% 07/11/2017 0.0  0.0 - 1.9 % Final    Differential Method 07/11/2017 Automated   Final    Sodium 07/11/2017 143  136 - 145 mmol/L Final    Potassium 07/11/2017 3.7  3.5 - 5.1 mmol/L Final    Chloride 07/11/2017 110  95 - 110 mmol/L Final    CO2 07/11/2017 24  23 - 29 mmol/L Final    Glucose 07/11/2017 148* 70 - 110 mg/dL Final    BUN, Bld 07/11/2017 10  6 - 20 mg/dL Final    Creatinine 07/11/2017 1.0  0.5 - 1.4 mg/dL Final    Calcium 07/11/2017 9.4  8.7 - 10.5 mg/dL Final    Total Protein 07/11/2017 7.7  6.0 - 8.4 g/dL Final    Albumin 07/11/2017 4.0  3.5 - 5.2 g/dL Final    Total Bilirubin 07/11/2017 0.6  0.1 - 1.0 mg/dL Final    Alkaline Phosphatase 07/11/2017 90  55 - 135 U/L Final    AST 07/11/2017 21  10 - 40 U/L Final    ALT 07/11/2017 21  10 - 44 U/L Final    Anion Gap  07/11/2017 9  8 - 16 mmol/L Final    eGFR if African American 07/11/2017 >60  >60 mL/min/1.73 m^2 Final    eGFR if non African American 07/11/2017 >60  >60 mL/min/1.73 m^2 Final    TSH 07/11/2017 0.758  0.400 - 4.000 uIU/mL Final    Specimen UA 07/12/2017 Urine, Clean Catch   Final    Color, UA 07/12/2017 Yellow  Yellow, Straw, Kathleen Final    Appearance, UA 07/12/2017 Clear  Clear Final    pH, UA 07/12/2017 6.0  5.0 - 8.0 Final    Specific Gravity, UA 07/12/2017 1.025  1.005 - 1.030 Final    Protein, UA 07/12/2017 Trace* Negative Final    Glucose, UA 07/12/2017 Negative  Negative Final    Ketones, UA 07/12/2017 Trace* Negative Final    Bilirubin (UA) 07/12/2017 1+* Negative Final    Occult Blood UA 07/12/2017 Negative  Negative Final    Nitrite, UA 07/12/2017 Negative  Negative Final    Urobilinogen, UA 07/12/2017 1.0  <2.0 EU/dL Final    Leukocytes, UA 07/12/2017 Negative  Negative Final    Benzodiazepines 07/12/2017 Negative   Final    Methadone metabolites 07/12/2017 Negative   Final    Cocaine (Metab.) 07/12/2017 Presumptive Positive   Final    Opiate Scrn, Ur 07/12/2017 Negative   Final    Barbiturate Screen, Ur 07/12/2017 Negative   Final    Amphetamine Screen, Ur 07/12/2017 Negative   Final    THC 07/12/2017 Presumptive Positive   Final    Phencyclidine 07/12/2017 Negative   Final    Creatinine, Random Ur 07/12/2017 363.3  23.0 - 375.0 mg/dL Final    Toxicology Information 07/12/2017 SEE COMMENT   Final    Alcohol, Medical, Serum 07/11/2017 <10  <10 mg/dL Final    Acetaminophen (Tylenol), Serum 07/11/2017 <3.0* 10.0 - 20.0 ug/mL Final    Salicylate Lvl 07/11/2017 <5.0* 15.0 - 30.0 mg/dL Final               Past History:       Medications  Outpatient Encounter Medications as of 1/17/2019   Medication Sig Dispense Refill    [DISCONTINUED] ziprasidone (GEODON) 20 MG Cap Take 20 mg by mouth 2 (two) times daily.       No facility-administered encounter medications on file as of  2019.        Medication Compliance  None currently    Past Medical/Surgical History:   Past Medical History:   Diagnosis Date    ADHD     Depression      No past surgical history on file.    Neurological History:  Seizures:  no  Head Trauma:  no      Past Psychiatric History:   Previous Psychiatric Hospitalizations:  One detox center   Previous SI/HI: does have suicidal thoughts, once every two weeks, no plan, no intent  Previous Suicide Attempts: one suicide attempt/ tried to hang himself, had been using.    Previous Medication Trials: lexapro (did not take it consistently), vybriid (2 to 3 months used it, got it from a friend)  Psychiatric Care (current & past): 5 years ago saw a psychiatrist  History of Psychotherapy: never done therapy    ADHD diagnosis in middle school, his step-mother took him to get evaluated, and he did better, in school but then when she left meds stopped. Pediatrician put him on meds, he would get really depressed and down after the medication wore off, he felt that his heart was racing.     SOCIAL HISTORY:  Developmental/Childhood: mom  at 1 years old, dad verbally abusive was affectionate but up and down   History of Physical/Sexual Abuse: no  Education: Dropped out 9 th grade    Employment: -doing it for about a year    Financial: does well financially    Relationship Status/Sexual Orientation:  for 10 years   Children: 2 boys. And one still born girl 7 years ago-full term    Housing Status: lives with wife  Bahai: Worship    History: no   Recreational Activities: spend time with family,   Access to Gun: no firearms.     Substance Abuse History:   Substance of Choice: opiates  Substances Used:  Opiates and marijuana  Use of Alcohol:  ocassional  Tobacco:   2 packs a day   Rehab History:  Rehab completion once       Family History of Psychiatric History:  Family History   Problem Relation Age of Onset    Drug abuse Brother        Allergies:  Review  of patient's allergies indicates:  No Known Allergies    If Female: LMP, On Birth Control    Assessment - Diagnosis - Goals:     Impression: 27 yo male with history of opiate use disorder early remission,       ICD-10-CM ICD-9-CM   1. Bipolar affective disorder, remission status unspecified F31.9 296.80   2. Generalized anxiety disorder F41.1 300.02   3. Opioid dependence in remission F11.21 304.03   4. Cannabis use disorder, mild, in early remission F12.11 305.23       Strengths and Liabilities: Strength: Patient accepts guidance/feedback, Strength: Patient has positive support network., Liability: Patient lacks coping skills.    Treatment Goals:  Specify outcomes written in observable, behavioral terms:     -Decreased paranoia  -Improvement of anxious racing thoughts  -Less episodes of irritability     Treatment Plan/Recommendations:   Bipolar affective disorder/Anxiety Disorder   --Geodon 20 mg bid   --Avoid Benzodiazapines  --To be used as mood stabilizer and to control paranoia  --for now due to patient having a poor response to typical anti-depressants will use a Geodon as the primary medication.      Polysubstance Dependence  --encourage therapy  --encourage sponsor      -Patient was educated on possible side-effects of medications, voices understanding and wishes to start medication management on the above mentioned medications.   -Discussed 911 for suicidal or homicidal ideation or possible psychosis or worsening symptoms  -Provided patient education through patient portal  -Patient will contact clinic with any questions or concerns    Return to Clinic: 1 month    Counseling time: 10 min  Total time: 50 min    Kasia Snadoval MD  1/20/2019

## 2019-01-18 ENCOUNTER — PATIENT MESSAGE (OUTPATIENT)
Dept: PSYCHIATRY | Facility: CLINIC | Age: 29
End: 2019-01-18

## 2019-01-18 RX ORDER — ZIPRASIDONE HYDROCHLORIDE 20 MG/1
20 CAPSULE ORAL 2 TIMES DAILY
COMMUNITY
End: 2019-01-18

## 2019-01-18 RX ORDER — ZIPRASIDONE HYDROCHLORIDE 20 MG/1
20 CAPSULE ORAL 2 TIMES DAILY
Qty: 60 CAPSULE | Refills: 1 | Status: SHIPPED | OUTPATIENT
Start: 2019-01-18 | End: 2019-02-21

## 2019-01-18 NOTE — PATIENT INSTRUCTIONS
Ziprasidone capsules  What is this medicine?  ZIPRASIDONE (zi PRAY si done) is used to treat schizophrenia and bipolar disorder, also known as manic-depression.  How should I use this medicine?  Take this medicine by mouth with a glass of water. Follow the directions on the prescription label. Take with food. Take your doses at regular intervals. Do not take your medicine more often than directed. Do not stop taking except on the advice of your doctor or health care professional.  Talk to your pediatrician regarding the use of this medicine in children. Special care may be needed.  What side effects may I notice from receiving this medicine?  Side effects that you should report to your doctor or health care professional as soon as possible:  · allergic reactions like skin rash, itching or hives, swelling of the face, lips, or tongue  · change in emotion or behavior such as feeling depressed, angry, or anxious  · chest pain  · difficulty breathing  · difficulty swallowing  · excessive thirst and/or hunger  · fast or irregular heartbeat or palpitations  · fever or chills, sore throat  · frequently needing to urinate  · inability to control muscle movements in the face, hands, arms, or legs  · loss of balance or difficulty walking  · lump or swelling on the neck  · prolonged erection  · redness, blistering, peeling or loosening of the skin, including inside the mouth  · seizures  · stiff muscles or jaw  · tremor  · uncontrollable movements or spasms of the face, tongue or mouth  · weakness or loss of strength  Side effects that usually do not require medical attention (report to your doctor or health care professional if they continue or are bothersome):  · constipation  · drowsiness  · headache  · nausea or vomiting  · upset stomach  What may interact with this medicine?  Do not take this medicine with any of the following medications:  · alfuzosin  · arsenic trioxide  · certain antibiotics like clarithromycin,  erythromycin, gatifloxacin, grepafloxacin, levofloxacin, moxifloxacin, pentamidine, sparfloxacin, telithromycin, troleandomycin  · certain medicines for depression  · certain medicines for fungal infections like fluconazole, itraconazole, ketoconazole, posaconazole, voriconazole  · certain medicines for irregular heart beat like amiodarone, dofetilide, dronedarone, flecainide, procainamide, quinidine, sotalol  · chloroquine  · cisapride  · clozapine  · dolasetron  · droperidol  · halofantrine  · haloperidol  · methadone  · other medicines that prolong the QT interval (cause an abnormal heart rhythm)  · palonosetron  · phenothiazines like chlorpromazine, mesoridazine, thioridazine  · pimozide  · propafenone  · risperidone  · sertindole  · tacrolimus  · vardenafil  This medicine may also interact with the following medications:  · carbamazepine  · certain medicines for blood pressure  · certain medicines for Parkinson's disease  · diuretics  · stimulant medicines for attention disorders, weight loss, or to stay awake like amphetamine, dextroamphetamine  What if I miss a dose?  If you miss a dose, take it as soon as you can. If it is almost time for your next dose, take only that dose. Do not take double or extra doses.  Where should I keep my medicine?  Keep out of the reach of children.  Store at room temperature between 15 and 30 degrees C (59 and 86 degrees F). Throw away any unused medicine after the expiration date.  What should I tell my health care provider before I take this medicine?  They need to know if you have any of these conditions:  · dementia  · diabetes or high blood sugar  · heart disease, including heart failure  · irregular heartbeat  · liver disease  · low potassium level in the blood  · Parkinson's disease or other movement disorders  · previous heart attack or stroke  · suicidal thoughts, plans, or attempt by you or a family member  · an unusual or allergic reaction to ziprasidone, other  medicines, foods, dyes, or preservatives  · pregnant or trying to get pregnant  · breast-feeding  What should I watch for while using this medicine?  Visit your doctor or health care professional for regular checks on your progress. It may be several weeks before you see the full effects of this medicine. Do not suddenly stop taking this medicine. Your doctor may want you to gradually reduce the dose.  You may get drowsy or dizzy. Do not drive, use machinery, or do anything that needs mental alertness until you know how this drug affects you. Do not stand or sit up quickly, especially if you are an older patient. This reduces the risk of dizzy or fainting spells. Alcohol can make you more drowsy and dizzy. Avoid alcoholic drinks.  This medicine can make you more sensitive to the sun. Keep out of the sun. If you cannot avoid being in the sun, wear protective clothing and use sunscreen. Do not use sun lamps or tanning beds/booths.  This medicine can reduce the response of your body to heat or cold. Dress warm in cold weather and stay hydrated in hot weather. If possible, avoid extreme temperatures like saunas, hot tubs, very hot or cold showers, or activities that can cause dehydration such as vigorous exercise.  Your mouth may get dry. Chewing sugarless gum or sucking hard candy, and drinking plenty of water may help. Contact your doctor if the problem does not go away or is severe.  NOTE:This sheet is a summary. It may not cover all possible information. If you have questions about this medicine, talk to your doctor, pharmacist, or health care provider. Copyright© 2017 [employer]      Understanding Anxiety Disorders  Almost everyone gets nervous now and then. Its normal to have knots in your stomach before a test, or for your heart to race on a first date. But an anxiety disorder is much more than a case of nerves. In fact, its symptoms may be overwhelming. But treatment can relieve many of these symptoms.  Talking to your healthcare provider is the first step.    What are anxiety disorders?  An anxiety disorder causes intense feelings of panic and fear. These feelings may arise for no apparent reason. And they tend to recur again and again. They may prevent you from coping with life and cause you great distress. As a result, you may avoid anything that triggers your fear. In extreme cases, you may never leave the house. Anxiety disorders may cause other symptoms, such as:  · Obsessive thoughts you cant control  · Constant nightmares or painful thoughts of the past  · Nausea, sweating, and muscle tension  · Trouble sleeping or concentrating  What causes anxiety disorders?  Anxiety disorders tend to run in families. For some people, childhood abuse or neglect may play a role. For others, stressful life events or trauma may trigger anxiety disorders. Anxiety can trigger low self-esteem and poor coping skills.  Common anxiety disorders  · Panic disorder. This causes an intense fear of being in danger.  · Phobias. These are extreme fears of certain objects, places, or events.  · Obsessive-compulsive disorder. This causes you to have unwanted thoughts and urges. You also may perform certain actions over and over.  · Posttraumatic stress disorder. This occurs in people who have survived a terrible ordeal. It can cause nightmares and flashbacks about the event.  · Generalized anxiety disorder. This causes constant worry that can greatly disrupt your life.   Getting better  You may believe that nothing can help you. Or, you might fear what others may think. But most anxiety symptoms can be eased. Having an anxiety disorder is nothing to be ashamed of. Most people do best with treatment that combines medicine and therapy. These arent cures. But they can help you live a healthier life.  Date Last Reviewed: 2/1/2017  © 4261-4454 [employer]. [address] [place] [address], [place] [address]. All rights reserved. This  information is not intended as a substitute for professional medical care. Always follow your healthcare professional's instruction  Resources:  · National Institutes of Mental Bzmoag216-928-5732svb.nimh.nih.gov  · National [place] on Mental Jgseswx778-437-0441fba.tobi.org   · Mental Health Kemwalt774-385-3663wif.[facility].org  · National Suicide Luqjflk067-554-5370 (800-SUICIDE)  · National Suicide Prevention Awxqvpnn589-487-1883 (804-228-GBSH)www.suicidepreventionlifeline.org

## 2019-01-20 PROBLEM — F41.1 GENERALIZED ANXIETY DISORDER: Status: ACTIVE | Noted: 2019-01-20

## 2019-01-20 PROBLEM — F12.11 CANNABIS USE DISORDER, MILD, IN EARLY REMISSION: Status: ACTIVE | Noted: 2019-01-20

## 2019-01-20 PROBLEM — F31.9 BIPOLAR AFFECTIVE DISORDER: Status: ACTIVE | Noted: 2019-01-20

## 2019-01-21 ENCOUNTER — TELEPHONE (OUTPATIENT)
Dept: PSYCHIATRY | Facility: CLINIC | Age: 29
End: 2019-01-21

## 2019-02-14 ENCOUNTER — OFFICE VISIT (OUTPATIENT)
Dept: PSYCHIATRY | Facility: CLINIC | Age: 29
End: 2019-02-14
Payer: COMMERCIAL

## 2019-02-14 ENCOUNTER — CLINICAL SUPPORT (OUTPATIENT)
Dept: CARDIOLOGY | Facility: CLINIC | Age: 29
End: 2019-02-14
Payer: COMMERCIAL

## 2019-02-14 VITALS
DIASTOLIC BLOOD PRESSURE: 93 MMHG | WEIGHT: 220.25 LBS | HEART RATE: 85 BPM | SYSTOLIC BLOOD PRESSURE: 148 MMHG | BODY MASS INDEX: 30.72 KG/M2

## 2019-02-14 DIAGNOSIS — F11.21 OPIOID DEPENDENCE IN REMISSION: ICD-10-CM

## 2019-02-14 DIAGNOSIS — F31.9 BIPOLAR AFFECTIVE DISORDER, REMISSION STATUS UNSPECIFIED: ICD-10-CM

## 2019-02-14 DIAGNOSIS — F41.1 GENERALIZED ANXIETY DISORDER: ICD-10-CM

## 2019-02-14 DIAGNOSIS — F31.75 BIPOLAR DISORDER, IN PARTIAL REMISSION, MOST RECENT EPISODE DEPRESSED: Primary | ICD-10-CM

## 2019-02-14 PROCEDURE — 3008F BODY MASS INDEX DOCD: CPT | Mod: CPTII,S$GLB,, | Performed by: PSYCHIATRY & NEUROLOGY

## 2019-02-14 PROCEDURE — 93000 EKG 12-LEAD: ICD-10-PCS | Mod: S$GLB,,, | Performed by: INTERNAL MEDICINE

## 2019-02-14 PROCEDURE — 3008F PR BODY MASS INDEX (BMI) DOCUMENTED: ICD-10-PCS | Mod: CPTII,S$GLB,, | Performed by: PSYCHIATRY & NEUROLOGY

## 2019-02-14 PROCEDURE — 99999 PR PBB SHADOW E&M-EST. PATIENT-LVL II: CPT | Mod: PBBFAC,,, | Performed by: PSYCHIATRY & NEUROLOGY

## 2019-02-14 PROCEDURE — 99213 PR OFFICE/OUTPT VISIT, EST, LEVL III, 20-29 MIN: ICD-10-PCS | Mod: S$GLB,,, | Performed by: PSYCHIATRY & NEUROLOGY

## 2019-02-14 PROCEDURE — 93000 ELECTROCARDIOGRAM COMPLETE: CPT | Mod: S$GLB,,, | Performed by: INTERNAL MEDICINE

## 2019-02-14 PROCEDURE — 99999 PR PBB SHADOW E&M-EST. PATIENT-LVL II: ICD-10-PCS | Mod: PBBFAC,,, | Performed by: PSYCHIATRY & NEUROLOGY

## 2019-02-14 PROCEDURE — 99213 OFFICE O/P EST LOW 20 MIN: CPT | Mod: S$GLB,,, | Performed by: PSYCHIATRY & NEUROLOGY

## 2019-02-14 RX ORDER — ZIPRASIDONE HYDROCHLORIDE 20 MG/1
CAPSULE ORAL
Qty: 90 CAPSULE | Refills: 1 | Status: SHIPPED | OUTPATIENT
Start: 2019-02-14 | End: 2019-02-21

## 2019-02-14 NOTE — PROGRESS NOTES
ESTABLISHED OUTPATIENT VISIT   E/M LEVEL 3: [id number]    ENCOUNTER DATE: 2/21/2019  SITE: [facility], [facility]      HISTORY    CHIEF COMPLAINT   Obed Gardner is a 29 y.o. male who presents for followup of unspecified bipolar disorder, and opiate use disorder, in early remission    HPI   Patient reports improvement in mood. He does not feel as depressed as he has in the past. He still has some irritability, but it has been improving. Not having any suicidal thoughts, except when he has an argument with wife and his thinking goes to maybe he would be better off dead, but it is brief and he has not had any intent or plan to follow through. Although his symptoms have improved he does feel that there is still some more room for improvement because there is still some irritability and some episodes of elevated moods, although he is not as impulsive. Still having problems with concentration.         1)Depressed mood most of the day, nearly every day:Not as sad as before, not as irritable as before,   2) Markedly diminished interest or pleasure: yes  3) Significant weight loss when not dieting or weight gain or decrease or increase in appetite nearly every day: appetite is improving  4) Insomnia or hypersomnia nearly every day: better sleep,l falls asleep quicker  5) Psychomotor agitation or retardation nearly: feels sluggish   6) Fatigue or loss of energy nearly every day: fatigue   7) Feelings of worthlessness or excessive or inappropriate guilt: no  8) Diminished ability to think or concentrate, or indecisiveness, nearly every day: yes   9) Recurrent thoughts of death (not just fear of dying), recurrent suicidal ideation without a specific plan, or a suicide attempt or a specific plan for committing suicide: suicidal thoughts (after he and his got into argument)     Anxiety Symptoms:  Anxiety Disorder Symptoms:       Excessive Anxiety & Worry (occurring more days than not for at least past 6 months) yes  improving   Difficulty in Controlling Worry -- easier to controls  Sleep disturbance --not affecting sleep   Restlessness/psychomotor agitation -- not as restless   Irritability -- improved   Muscle tension/headaches -- improved   GI somatic complaints (e.g., IBS, frequent dyspepsia) -- yes     JORI SCREEN  A.  persistently elevated, expansive, or irritable mood and increased activity or energy, lasting at least one week (or any duration if hospitalization is necessary). Recently he has not had any episodes that last a week, but will have about two days where his mood is more irritable and feels more agitated, and some decreased need for sleep, but for the most part has been doing better.          Psychosis: decreased paranoia of people trying to conspire against him or of wife being unfaithful      ROS   Review of Systems   Neurological: Negative for tremors and speech change.   Psychiatric/Behavioral: Positive for depression. Negative for hallucinations, substance abuse and suicidal ideas. The patient is nervous/anxious.    However, there has been an improvement in symptoms      PFSH  Past Medical History reviewed: Yes  Family History reviewed: Yes  Social History reviewed: Yes    Allergies:   Review of patient's allergies indicates:  No Known Allergies     PSYCHOTROPIC MEDICATIONS   Current Outpatient Medications on File Prior to Visit   Medication Sig Dispense Refill    [DISCONTINUED] ziprasidone (GEODON) 20 MG Cap Take 1 capsule (20 mg total) by mouth 2 (two) times daily. 60 capsule 1     No current facility-administered medications on file prior to visit.          EXAMINATION    [unfilled]  Vitals:    02/14/19 1452   BP: (!) 148/93   Pulse: 85     Wt Readings from Last 1 Encounters:   02/14/19 99.9 kg (220 lb 3.8 oz)     Constitutional  General:   Well groomed, age appropriate      Musculoskeletal  Gait & Station:   non ataxic      Psychiatric  Speech:   clear and coherent, regular rate and rhythm   Mood &  Affect:  Mood: some anxiety, Affect: improving in range, not as guarded not as blunted    Thought Process:  Linear, logical, goal directed   Thought Content:  Negative for paranoid delusions, no auditory or visual hallucinations, no si or hi   Insight:  fair   Judgement: intact   Orientation:  Oriented to time, place, person, and situation   Memory: Intact based on conversations, not formally tested    Language: Intact   Attention Span & Concentration:  Intact to conversation.    Fund of Knowledge:  Adequate for age and education level       MEDICAL DECISION MAKING    Encounter Diagnoses   Name Primary?    Bipolar disorder, in partial remission, most recent episode depressed Yes    Generalized anxiety disorder     Opioid dependence in remission          PROBLEM LIST AND MANAGEMENT PLANS    Current Outpatient Medications:     ziprasidone (GEODON) 20 MG Cap, Take 1 capsule (20 mg total) by mouth once daily AND 2 capsules (40 mg total) every evening., Disp: 90 capsule, Rfl: 1     Patient will increase night time dose of ziprasidone. Educated about importance of taking medication with a meal at least 350 calories.  Discussed risks and benefits of medication.      Medications Discontinued During This Encounter   Medication Reason    ziprasidone (GEODON) 20 MG Cap     ziprasidone (GEODON) 20 MG Cap      Discussed ADHD testing, in the past patient on Vyvanse stopped taking it because of blunting affect. Discussed previous drug use being a concern for starting the medication. Would need formal psych testing to determine indication for medication. If this is done, would do random drug tests.     PRESCRIPTION DRUG MANAGEMENT  Compliance: yes  Side Effects: none  Regimen Adjustments: increased bedtime Geodon dose to 40 mg and kept morning dose the same.       DIAGNOSTIC TESTING  adhd testing     Kasia Sandoval

## 2019-02-18 ENCOUNTER — PATIENT MESSAGE (OUTPATIENT)
Dept: PSYCHIATRY | Facility: CLINIC | Age: 29
End: 2019-02-18

## 2019-02-21 RX ORDER — ZIPRASIDONE HYDROCHLORIDE 20 MG/1
CAPSULE ORAL
Qty: 90 CAPSULE | Refills: 1 | Status: SHIPPED | OUTPATIENT
Start: 2019-02-21 | End: 2019-03-18

## 2019-02-25 ENCOUNTER — PATIENT MESSAGE (OUTPATIENT)
Dept: PSYCHIATRY | Facility: CLINIC | Age: 29
End: 2019-02-25

## 2019-03-17 ENCOUNTER — PATIENT MESSAGE (OUTPATIENT)
Dept: PSYCHIATRY | Facility: CLINIC | Age: 29
End: 2019-03-17

## 2019-03-18 ENCOUNTER — PATIENT MESSAGE (OUTPATIENT)
Dept: PSYCHIATRY | Facility: CLINIC | Age: 29
End: 2019-03-18

## 2019-03-18 RX ORDER — ZIPRASIDONE HYDROCHLORIDE 40 MG/1
40 CAPSULE ORAL 2 TIMES DAILY
Qty: 60 CAPSULE | Refills: 2 | Status: SHIPPED | OUTPATIENT
Start: 2019-03-18 | End: 2019-04-02

## 2019-03-25 ENCOUNTER — PATIENT MESSAGE (OUTPATIENT)
Dept: PSYCHIATRY | Facility: CLINIC | Age: 29
End: 2019-03-25

## 2019-04-02 ENCOUNTER — OFFICE VISIT (OUTPATIENT)
Dept: PSYCHIATRY | Facility: CLINIC | Age: 29
End: 2019-04-02
Payer: COMMERCIAL

## 2019-04-02 ENCOUNTER — PATIENT MESSAGE (OUTPATIENT)
Dept: PSYCHIATRY | Facility: CLINIC | Age: 29
End: 2019-04-02

## 2019-04-02 VITALS
SYSTOLIC BLOOD PRESSURE: 136 MMHG | HEART RATE: 86 BPM | DIASTOLIC BLOOD PRESSURE: 96 MMHG | WEIGHT: 218.69 LBS | BODY MASS INDEX: 30.5 KG/M2

## 2019-04-02 DIAGNOSIS — F41.1 GENERALIZED ANXIETY DISORDER: ICD-10-CM

## 2019-04-02 DIAGNOSIS — F90.9 ADULT ADHD: Primary | ICD-10-CM

## 2019-04-02 DIAGNOSIS — F11.21 OPIOID DEPENDENCE IN REMISSION: ICD-10-CM

## 2019-04-02 DIAGNOSIS — F31.75 BIPOLAR DISORDER, IN PARTIAL REMISSION, MOST RECENT EPISODE DEPRESSED: ICD-10-CM

## 2019-04-02 DIAGNOSIS — F12.20 CANNABIS DEPENDENCE: ICD-10-CM

## 2019-04-02 PROCEDURE — 99213 PR OFFICE/OUTPT VISIT, EST, LEVL III, 20-29 MIN: ICD-10-PCS | Mod: S$GLB,,, | Performed by: PSYCHIATRY & NEUROLOGY

## 2019-04-02 PROCEDURE — 3008F BODY MASS INDEX DOCD: CPT | Mod: CPTII,S$GLB,, | Performed by: PSYCHIATRY & NEUROLOGY

## 2019-04-02 PROCEDURE — 3008F PR BODY MASS INDEX (BMI) DOCUMENTED: ICD-10-PCS | Mod: CPTII,S$GLB,, | Performed by: PSYCHIATRY & NEUROLOGY

## 2019-04-02 PROCEDURE — 99213 OFFICE O/P EST LOW 20 MIN: CPT | Mod: S$GLB,,, | Performed by: PSYCHIATRY & NEUROLOGY

## 2019-04-02 PROCEDURE — 99999 PR PBB SHADOW E&M-EST. PATIENT-LVL II: CPT | Mod: PBBFAC,,, | Performed by: PSYCHIATRY & NEUROLOGY

## 2019-04-02 PROCEDURE — 99999 PR PBB SHADOW E&M-EST. PATIENT-LVL II: ICD-10-PCS | Mod: PBBFAC,,, | Performed by: PSYCHIATRY & NEUROLOGY

## 2019-04-02 RX ORDER — FLUOXETINE 10 MG/1
10 CAPSULE ORAL DAILY
Qty: 30 CAPSULE | Refills: 1 | Status: SHIPPED | OUTPATIENT
Start: 2019-04-02 | End: 2019-04-29 | Stop reason: SDUPTHER

## 2019-04-02 RX ORDER — ZIPRASIDONE HYDROCHLORIDE 20 MG/1
20 CAPSULE ORAL 2 TIMES DAILY
Qty: 60 CAPSULE | Refills: 1 | Status: SHIPPED | OUTPATIENT
Start: 2019-04-02 | End: 2019-04-29 | Stop reason: SDUPTHER

## 2019-04-02 NOTE — PROGRESS NOTES
ESTABLISHED OUTPATIENT VISIT   E/M LEVEL 3: 19402    ENCOUNTER DATE: 4/4/2019  SITE: [facility], [place]      HISTORY    CHIEF COMPLAINT   Obed Gardner is a 29 y.o. male who presents for followup of unspecified bipolar disorder, and opiate use disorder, in early remission      HPI   Currently patient has noticed that the medication is no longer working. Feels that anxiety is worse, has always had anxiety but not as bad as when he went up on the dose of the medication. Because of the anxiety being elevated, kind of irritable, still has the paranoia. No relapse on opiates, but has restarted with the energy drinks, and he started doing that because feeling drained on the medicine.   Right now the biggest struggle is the sexual side-effects, the worsening anxiety, and the feeling drained in the morning. Still having problems with concentration, and still having some irritability. Currently having problems with his wife, most likely mood related. At work, his performance has suffered because of the anxiety and he had to leave the next day. Suicidal thoughts once last week, because he didn't take the medicine in the morning, but it was overwhelming for him.          JORI SCREEN  A.  persistently elevated, expansive, or irritable mood and increased activity or energy, lasting at least one week (or any duration if hospitalization is necessary). Recently he has not had any episodes that last a week, but will have about two days where his mood is more irritable and feels more agitated, and some decreased need for sleep       Psychosis: PARANOIA HAS RETURNED AND JUST AS SUSPICIOUS OF OTHERS.       ROS   Pertinent positives in HPI      PFSH  Past Medical History reviewed: Yes  Family History reviewed: Yes  Social History reviewed: Yes    Has relapsed on MONSTER energy drinks but not on opiates    Allergies:   Review of patient's allergies indicates:  No Known Allergies     PSYCHOTROPIC MEDICATIONS   No  current outpatient medications on file prior to visit.     No current facility-administered medications on file prior to visit.        EXAMINATION    [unfilled]  Vitals:    04/02/19 0758   BP: (!) 136/96   Pulse: 86     Wt Readings from Last 1 Encounters:   04/02/19 99.2 kg (218 lb 11.1 oz)         Constitutional  General:   Well groomed, age appropriate      Musculoskeletal  Gait & Station:   non ataxic      Psychiatric  Speech:   clear and coherent, regular rate and rhythm   Mood & Affect:  Mood: depressed, irritable Affect: restricted    Thought Process:  Linear, logical, goal directed   Thought Content:  + persecutory thoughts, no auditory or visual hallucinations, no si or hi   Insight:  fair   Judgement: intact   Orientation:  Oriented to time, place, person, and situation   Memory: Intact based on conversations, not formally tested    Language: Intact   Attention Span & Concentration:  Intact to conversation.    Fund of Knowledge:  Adequate for age and education level       MEDICAL DECISION MAKING    Encounter Diagnoses   Name Primary?    Adult ADHD Yes    Bipolar disorder, in partial remission, most recent episode depressed     Opioid dependence in remission     Cannabis dependence     Generalized anxiety disorder          PROBLEM LIST AND MANAGEMENT PLANS    -Decreased dosage of Geodon to 20 mg twice daily, due to increased irritability with 40 mg twice daily dose  -Prozac 10 mg daily to treat depression and anxiety, has found it helpful in the past.  -Encouraged to reach out to AA support group   -Discussed side-effects and patient gave informed concent    Medications Discontinued During This Encounter   Medication Reason    ziprasidone (GEODON) 40 MG Cap      Discussed ADHD testing, in the past patient on Vyvanse stopped taking it because of blunting affect. Discussed previous drug use being a concern for starting the medication. Would need formal psych testing to determine indication for medication. If  this is done, would do random drug tests.     PRESCRIPTION DRUG MANAGEMENT  Compliance: yes  Side Effects: none  Regimen Adjustments: decrease dosage of Geodon to 20 mg twice daily    DIAGNOSTIC TESTING  adhd testing     Kasia Sandoval

## 2019-04-29 ENCOUNTER — OFFICE VISIT (OUTPATIENT)
Dept: PSYCHIATRY | Facility: CLINIC | Age: 29
End: 2019-04-29
Payer: COMMERCIAL

## 2019-04-29 VITALS
HEART RATE: 84 BPM | SYSTOLIC BLOOD PRESSURE: 133 MMHG | WEIGHT: 218.06 LBS | DIASTOLIC BLOOD PRESSURE: 85 MMHG | BODY MASS INDEX: 30.41 KG/M2

## 2019-04-29 DIAGNOSIS — F31.75 BIPOLAR DISORDER, IN PARTIAL REMISSION, MOST RECENT EPISODE DEPRESSED: Primary | ICD-10-CM

## 2019-04-29 DIAGNOSIS — F12.11 CANNABIS USE DISORDER, MILD, IN EARLY REMISSION: ICD-10-CM

## 2019-04-29 DIAGNOSIS — F11.21 OPIOID DEPENDENCE IN REMISSION: ICD-10-CM

## 2019-04-29 DIAGNOSIS — F41.1 GENERALIZED ANXIETY DISORDER: ICD-10-CM

## 2019-04-29 PROCEDURE — 99999 PR PBB SHADOW E&M-EST. PATIENT-LVL II: CPT | Mod: PBBFAC,,, | Performed by: PSYCHIATRY & NEUROLOGY

## 2019-04-29 PROCEDURE — 99213 PR OFFICE/OUTPT VISIT, EST, LEVL III, 20-29 MIN: ICD-10-PCS | Mod: S$GLB,,, | Performed by: PSYCHIATRY & NEUROLOGY

## 2019-04-29 PROCEDURE — 99213 OFFICE O/P EST LOW 20 MIN: CPT | Mod: S$GLB,,, | Performed by: PSYCHIATRY & NEUROLOGY

## 2019-04-29 PROCEDURE — 99999 PR PBB SHADOW E&M-EST. PATIENT-LVL II: ICD-10-PCS | Mod: PBBFAC,,, | Performed by: PSYCHIATRY & NEUROLOGY

## 2019-04-29 PROCEDURE — 3008F PR BODY MASS INDEX (BMI) DOCUMENTED: ICD-10-PCS | Mod: CPTII,S$GLB,, | Performed by: PSYCHIATRY & NEUROLOGY

## 2019-04-29 PROCEDURE — 3008F BODY MASS INDEX DOCD: CPT | Mod: CPTII,S$GLB,, | Performed by: PSYCHIATRY & NEUROLOGY

## 2019-04-29 RX ORDER — ZIPRASIDONE HYDROCHLORIDE 20 MG/1
20 CAPSULE ORAL 2 TIMES DAILY
Qty: 60 CAPSULE | Refills: 3 | Status: SHIPPED | OUTPATIENT
Start: 2019-04-29 | End: 2019-06-10 | Stop reason: SDUPTHER

## 2019-04-29 RX ORDER — FLUOXETINE 10 MG/1
10 CAPSULE ORAL DAILY
Qty: 30 CAPSULE | Refills: 3 | Status: SHIPPED | OUTPATIENT
Start: 2019-04-29 | End: 2019-06-10

## 2019-05-02 NOTE — PROGRESS NOTES
ESTABLISHED OUTPATIENT VISIT   E/M LEVEL 3: [id number]    ENCOUNTER DATE: 5/2/2019  SITE: [facility], [place]      HISTORY  FROM visit 4/1/19  Currently patient has noticed that the medication is no longer working. Feels that anxiety is worse, has always had anxiety but not as bad as when he went up on the dose of the medication. Because of the anxiety being elevated, kind of irritable, still has the paranoia. No relapse on opiates, but has restarted with the energy drinks, and he started doing that because feeling drained on the medicine.   Right now the biggest struggle is the sexual side-effects, the worsening anxiety, and the feeling drained in the morning. Still having problems with concentration, and still having some irritability. Currently having problems with his wife, most likely mood related. At work, his performance has suffered because of the anxiety and he had to leave the next day. Suicidal thoughts once last week, because he didn't take the medicine in the morning, but it was overwhelming for him.     Discussed ADHD testing, in the past patient on Vyvanse stopped taking it because of blunting affect. Discussed previous drug use being a concern for starting the medication. Would need formal psych testing to determine indication for medication. If this is done, would do random drug tests.       -Decreased dosage of Geodon to 20 mg twice daily, due to increased irritability with 40 mg twice daily dose  -Prozac 10 mg daily to treat depression and anxiety, has found it helpful in the past.  -Encouraged to reach out to AA support group   -Discussed side-effects and patient gave informed concent    CHIEF COMPLAINT   Obed Gardner is a 29 y.o. male who presents for followup of unspecified bipolar disorder, and opiate use disorder, in early remission      HPI   Recently discharged from inpatient psychiatric hospitalization. He was drunk, saw his wife and best friend having intercourse and  became upset and threatened suicide. He was admitted to the hospital, kept for 4 days and discharged. He is in a polyamorous relationship with wife and bf and his girlfriend. However, he thinks that his reaction was more due to the fact that his friend had been drinking heavily that night and he is not comfortable with his level of drinking.   He denies any suicidal thinking. Says that his mood is overall stable, understands that he cannot drink because it interferes with his medication, and sobriety. Discussed re-engaging in AA or reaching out to the treatment center he was going to and finding a sponsor. Discuss the need to discuss his progress and set-backs when it comes to sober living. Discussed need for individual counseling as well as possibly couples counseling with wife. He and wife both used together and he is uncomfortable being in the house because he remembers using there and it is a trigger for him.  He currently denies any SI or thoughts of harming others.   Currently denies any episodes of decreased need for sleep with increased impulsivity or paranoid thinking       ROS   Pertinent symptoms in HPI      PFSH  Past Medical History reviewed: Yes  Family History reviewed: Yes  Social History reviewed: Yes    Currently maintains that he has not resumed using opiates  Did start drinking, currently staying away from alcohol    Allergies:   Review of patient's allergies indicates:  No Known Allergies     PSYCHOTROPIC MEDICATIONS   No current outpatient medications on file prior to visit.     No current facility-administered medications on file prior to visit.        EXAMINATION    [unfilled]  Vitals:    04/29/19 1438   BP: 133/85   Pulse: 84     Wt Readings from Last 1 Encounters:   04/29/19 98.9 kg (218 lb 0.6 oz)         Constitutional  General:   Well groomed, age appropriate      Musculoskeletal  Gait & Station:   non ataxic      Psychiatric  Speech:   clear and coherent, regular rate and rhythm   Mood &  Affect:  Mood: euthymic  Affect: broad   Thought Process:  Linear, logical, goal directed   Thought Content:  Denies any paranoid thinking   Insight:  fair   Judgement: intact   Orientation:  Oriented to time, place, person, and situation   Memory: Intact based on conversations, not formally tested    Language: Intact   Attention Span & Concentration:  Intact to conversation.    Fund of Knowledge:  Adequate for age and education level       MEDICAL DECISION MAKING    Encounter Diagnoses   Name Primary?    Bipolar disorder, in partial remission, most recent episode depressed Yes    Opioid dependence in remission     Cannabis use disorder, mild, in early remission     Generalized anxiety disorder          PROBLEM LIST AND MANAGEMENT PLANS      Medications Discontinued During This Encounter   Medication Reason    ziprasidone (GEODON) 20 MG Cap Reorder    FLUoxetine 10 MG capsule Reorder     Continue Geodon 20 mg twice daily  Continue Fluoxetine 10 mg daily     PRESCRIPTION DRUG MANAGEMENT  Compliance: yes  Side Effects: none  Regimen Adjustments: none    DIAGNOSTIC TESTING  adhd testing     Kasia Sandoval

## 2019-05-06 ENCOUNTER — INITIAL CONSULT (OUTPATIENT)
Dept: PSYCHIATRY | Facility: CLINIC | Age: 29
End: 2019-05-06
Payer: COMMERCIAL

## 2019-05-06 DIAGNOSIS — F10.20 ALCOHOL USE DISORDER, MODERATE, DEPENDENCE: ICD-10-CM

## 2019-05-06 DIAGNOSIS — F31.75 BIPOLAR DISORDER, IN PARTIAL REMISSION, MOST RECENT EPISODE DEPRESSED: Primary | ICD-10-CM

## 2019-05-06 DIAGNOSIS — F41.1 GENERALIZED ANXIETY DISORDER: ICD-10-CM

## 2019-05-06 DIAGNOSIS — F12.11 CANNABIS USE DISORDER, MILD, IN EARLY REMISSION: ICD-10-CM

## 2019-05-06 DIAGNOSIS — F11.21 OPIOID DEPENDENCE IN REMISSION: ICD-10-CM

## 2019-05-06 PROCEDURE — 90791 PSYCH DIAGNOSTIC EVALUATION: CPT | Mod: S$GLB,,, | Performed by: SOCIAL WORKER

## 2019-05-06 PROCEDURE — 90791 PR PSYCHIATRIC DIAGNOSTIC EVALUATION: ICD-10-PCS | Mod: S$GLB,,, | Performed by: SOCIAL WORKER
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
## 2019-06-10 ENCOUNTER — OFFICE VISIT (OUTPATIENT)
Dept: PSYCHIATRY | Facility: CLINIC | Age: 29
End: 2019-06-10
Payer: COMMERCIAL

## 2019-06-10 VITALS
DIASTOLIC BLOOD PRESSURE: 91 MMHG | BODY MASS INDEX: 31.98 KG/M2 | SYSTOLIC BLOOD PRESSURE: 129 MMHG | HEART RATE: 94 BPM | WEIGHT: 229.25 LBS

## 2019-06-10 DIAGNOSIS — F12.20 CANNABIS DEPENDENCE: ICD-10-CM

## 2019-06-10 DIAGNOSIS — F11.21 OPIOID DEPENDENCE IN REMISSION: ICD-10-CM

## 2019-06-10 DIAGNOSIS — F31.75 BIPOLAR DISORDER, IN PARTIAL REMISSION, MOST RECENT EPISODE DEPRESSED: Primary | ICD-10-CM

## 2019-06-10 PROCEDURE — 3008F BODY MASS INDEX DOCD: CPT | Mod: CPTII,S$GLB,, | Performed by: PSYCHIATRY & NEUROLOGY

## 2019-06-10 PROCEDURE — 99999 PR PBB SHADOW E&M-EST. PATIENT-LVL II: ICD-10-PCS | Mod: PBBFAC,,, | Performed by: PSYCHIATRY & NEUROLOGY

## 2019-06-10 PROCEDURE — 99213 PR OFFICE/OUTPT VISIT, EST, LEVL III, 20-29 MIN: ICD-10-PCS | Mod: S$GLB,,, | Performed by: PSYCHIATRY & NEUROLOGY

## 2019-06-10 PROCEDURE — 99999 PR PBB SHADOW E&M-EST. PATIENT-LVL II: CPT | Mod: PBBFAC,,, | Performed by: PSYCHIATRY & NEUROLOGY

## 2019-06-10 PROCEDURE — 99213 OFFICE O/P EST LOW 20 MIN: CPT | Mod: S$GLB,,, | Performed by: PSYCHIATRY & NEUROLOGY

## 2019-06-10 PROCEDURE — 3008F PR BODY MASS INDEX (BMI) DOCUMENTED: ICD-10-PCS | Mod: CPTII,S$GLB,, | Performed by: PSYCHIATRY & NEUROLOGY

## 2019-06-10 RX ORDER — FLUOXETINE HYDROCHLORIDE 20 MG/1
20 CAPSULE ORAL DAILY
Qty: 30 CAPSULE | Refills: 3 | Status: SHIPPED | OUTPATIENT
Start: 2019-06-10 | End: 2019-08-05

## 2019-06-10 RX ORDER — ZIPRASIDONE HYDROCHLORIDE 20 MG/1
20 CAPSULE ORAL 2 TIMES DAILY
Qty: 60 CAPSULE | Refills: 3 | Status: SHIPPED | OUTPATIENT
Start: 2019-06-10 | End: 2019-08-05 | Stop reason: SDUPTHER

## 2019-06-10 NOTE — PROGRESS NOTES
ESTABLISHED OUTPATIENT VISIT   E/M LEVEL 3: [id number]    ENCOUNTER DATE: 6/21/2019  SITE: [facility] [place] [place]      HISTORY  FROM visit 4/1/19  Currently patient has noticed that the medication is no longer working. Feels that anxiety is worse, has always had anxiety but not as bad as when he went up on the dose of the medication. Because of the anxiety being elevated, kind of irritable, still has the paranoia. No relapse on opiates, but has restarted with the energy drinks, and he started doing that because feeling drained on the medicine.   Right now the biggest struggle is the sexual side-effects, the worsening anxiety, and the feeling drained in the morning. Still having problems with concentration, and still having some irritability. Currently having problems with his wife, most likely mood related. At work, his performance has suffered because of the anxiety and he had to leave the next day. Suicidal thoughts once last week, because he didn't take the medicine in the morning, but it was overwhelming for him.     Discussed ADHD testing, in the past patient on Vyvanse stopped taking it because of blunting affect. Discussed previous drug use being a concern for starting the medication. Would need formal psych testing to determine indication for medication. If this is done, would do random drug tests.       -Decreased dosage of Geodon to 20 mg twice daily, due to increased irritability with 40 mg twice daily dose  -Prozac 10 mg daily to treat depression and anxiety, has found it helpful in the past.  -Encouraged to reach out to AA support group   -Discussed side-effects and patient gave informed concent    From last visit 4/29/19      CHIEF COMPLAINT   Obed Gardner is a 29 y.o. male who presents for followup of unspecified bipolar disorder, and opiate use disorder, in early remission      HPI     Geodon 20 mg twice a day and Prozac 10 mg. Does have anxiety, feels that the prozac has helped, but  does find that still has some anxiety and sometimes is still hard to stop it once it starts. Denies any negative side effects on the Prozac or Geodon. No sexual side effects, initially thought Geodon was causing some sexual side effects, but not any more. No suicidal thinking. No homicidal thinking. No problems with impulsivity. Currently not drinking. Currently not using drugs.   At this point feels that medications have been effective, wants to remain on current dosage.   Denies any current side Effects.  No abnormal movements noted  No signs of Extra pyramidal symptoms     ROS   [facility]  Past Medical History reviewed: Yes  Family History reviewed: Yes  Social History reviewed: Yes    Currently maintains that he has not resumed using opiates  Did start drinking, currently staying away from alcohol    Allergies:   Review of patient's allergies indicates:  No Known Allergies     PSYCHOTROPIC MEDICATIONS   No current outpatient medications on file prior to visit.     No current facility-administered medications on file prior to visit.        EXAMINATION    [unfilled]  Vitals:    06/10/19 1400   BP: (!) 129/91   Pulse: 94     Wt Readings from Last 1 Encounters:   06/10/19 104 kg (229 lb 4.5 oz)       He says that his blood pressure being high was due to smoking a cigarette.   Constitutional  General:   Well groomed, age appropriate      Musculoskeletal  Gait & Station:   non ataxic      Psychiatric  Speech:   clear and coherent, regular rate and rhythm   Mood & Affect:  Mood: euthymic  Affect: broad   Thought Process:  Linear, logical, goal directed   Thought Content:  Denies any paranoid thinking   Insight:  fair   Judgement: intact   Orientation:  Oriented to time, place, person, and situation   Memory: Intact based on conversations, not formally tested    Language: Intact   Attention Span & Concentration:  Intact to conversation.    Fund of Knowledge:  Adequate for age and education level        MEDICAL DECISION MAKING    Encounter Diagnoses   Name Primary?    Bipolar disorder, in partial remission, most recent episode depressed Yes    Opioid dependence in remission     Cannabis dependence          PROBLEM LIST AND MANAGEMENT PLANS      Continue Geodon at 20 mg twice daily  Continue Prozac at 20 mg daily      PRESCRIPTION DRUG MANAGEMENT  Compliance: yes  Side Effects: none  Regimen Adjustments: none    DIAGNOSTIC TESTING  none    Kasia Sandoval

## 2019-08-05 ENCOUNTER — OFFICE VISIT (OUTPATIENT)
Dept: PSYCHIATRY | Facility: CLINIC | Age: 29
End: 2019-08-05
Payer: COMMERCIAL

## 2019-08-05 VITALS
HEART RATE: 87 BPM | WEIGHT: 217.81 LBS | DIASTOLIC BLOOD PRESSURE: 98 MMHG | BODY MASS INDEX: 30.38 KG/M2 | SYSTOLIC BLOOD PRESSURE: 145 MMHG

## 2019-08-05 DIAGNOSIS — F31.75 BIPOLAR DISORDER, IN PARTIAL REMISSION, MOST RECENT EPISODE DEPRESSED: Primary | ICD-10-CM

## 2019-08-05 PROCEDURE — 99999 PR PBB SHADOW E&M-EST. PATIENT-LVL II: ICD-10-PCS | Mod: PBBFAC,,, | Performed by: PSYCHIATRY & NEUROLOGY

## 2019-08-05 PROCEDURE — 99999 PR PBB SHADOW E&M-EST. PATIENT-LVL II: CPT | Mod: PBBFAC,,, | Performed by: PSYCHIATRY & NEUROLOGY

## 2019-08-05 PROCEDURE — 3008F BODY MASS INDEX DOCD: CPT | Mod: CPTII,S$GLB,, | Performed by: PSYCHIATRY & NEUROLOGY

## 2019-08-05 PROCEDURE — 99213 PR OFFICE/OUTPT VISIT, EST, LEVL III, 20-29 MIN: ICD-10-PCS | Mod: S$GLB,,, | Performed by: PSYCHIATRY & NEUROLOGY

## 2019-08-05 PROCEDURE — 99213 OFFICE O/P EST LOW 20 MIN: CPT | Mod: S$GLB,,, | Performed by: PSYCHIATRY & NEUROLOGY

## 2019-08-05 PROCEDURE — 3008F PR BODY MASS INDEX (BMI) DOCUMENTED: ICD-10-PCS | Mod: CPTII,S$GLB,, | Performed by: PSYCHIATRY & NEUROLOGY

## 2019-08-05 RX ORDER — ZIPRASIDONE HYDROCHLORIDE 20 MG/1
20 CAPSULE ORAL 2 TIMES DAILY
Qty: 60 CAPSULE | Refills: 3 | Status: SHIPPED | OUTPATIENT
Start: 2019-08-05 | End: 2019-11-01 | Stop reason: SDUPTHER

## 2019-08-05 NOTE — PROGRESS NOTES
ESTABLISHED OUTPATIENT VISIT   E/M LEVEL 3: [id number]    ENCOUNTER DATE: 8/20/2019  SITE: [facility], [place]      HISTORY  FROM visit 4/1/19  Currently patient has noticed that the medication is no longer working. Feels that anxiety is worse, has always had anxiety but not as bad as when he went up on the dose of the medication. Because of the anxiety being elevated, kind of irritable, still has the paranoia. No relapse on opiates, but has restarted with the energy drinks, and he started doing that because feeling drained on the medicine.   Right now the biggest struggle is the sexual side-effects, the worsening anxiety, and the feeling drained in the morning. Still having problems with concentration, and still having some irritability. Currently having problems with his wife, most likely mood related. At work, his performance has suffered because of the anxiety and he had to leave the next day. Suicidal thoughts once last week, because he didn't take the medicine in the morning, but it was overwhelming for him.     Discussed ADHD testing, in the past patient on Vyvanse stopped taking it because of blunting affect. Discussed previous drug use being a concern for starting the medication. Would need formal psych testing to determine indication for medication. If this is done, would do random drug tests.       -Decreased dosage of Geodon to 20 mg twice daily, due to increased irritability with 40 mg twice daily dose  -Prozac 10 mg daily to treat depression and anxiety, has found it helpful in the past.  -Encouraged to reach out to AA support group   -Discussed side-effects and patient gave informed concent    From last visit 4/29/19    Geodon 20 mg twice a day and Prozac 10 mg. Does have anxiety, feels that the prozac has helped, but does find that still has some anxiety and sometimes is still hard to stop it once it starts. Denies any negative side effects on the Prozac or Geodon. No sexual side effects,  initially thought Geodon was causing some sexual side effects, but not any more. No suicidal thinking. No homicidal thinking. No problems with impulsivity. Currently not drinking. Currently not using drugs.   At this point feels that medications have been effective, wants to remain on current dosage.   Denies any current side Effects.  No abnormal movements noted  No signs of Extra pyramidal symptoms         From Last Visit 6/10/19:   CHIEF COMPLAINT   Obed Gardner is a 29 y.o. male who presents for followup of unspecified bipolar disorder, and opiate use disorder, in early remission      HPI   Going through a separation from his wife, but it's amicable. He did not tolerate the anti-depressant   Struggling with addiction with Adderall, hadn't had anything about a week. Liked the mental focus that it gave him.   He hasn't been drinking, haven't taken opiates.  Still doing well at work  He is happier  Active with his kids  Not stuck in his head  Sleeps fine.   No suicidal thinking  No impulsivity  Appears more stable with regard to his mood.        ROS   Pertinent symptoms in HPI      PFSH  Past Medical History reviewed: Yes  Family History reviewed: Yes  Social History reviewed: Yes    Currently maintains that he has not resumed using opiates  Did start drinking, currently staying away from alcohol     Allergies:   Review of patient's allergies indicates:  No Known Allergies     PSYCHOTROPIC MEDICATIONS   No current outpatient medications on file prior to visit.     No current facility-administered medications on file prior to visit.        EXAMINATION    [unfilled]  Vitals:    08/05/19 1400   BP: (!) 145/98   Pulse: 87     Wt Readings from Last 1 Encounters:   08/05/19 98.8 kg (217 lb 13 oz)       He says that his blood pressure being high was due to smoking a cigarette.   Constitutional  General:   Well groomed, age appropriate      Musculoskeletal  Gait & Station:   non ataxic      Psychiatric  Speech:    clear and coherent, regular rate and rhythm   Mood & Affect:  Mood: euthymic, a little stressed Affect: broad   Thought Process:  Linear, logical, goal directed   Thought Content:  Denies any paranoid thinking   Insight:  fair   Judgement: intact   Orientation:  Oriented to time, place, person, and situation   Memory: Intact based on conversations, not formally tested    Language: Intact   Attention Span & Concentration:  Intact to conversation.    Fund of Knowledge:  Adequate for age and education level       MEDICAL DECISION MAKING    Encounter Diagnosis   Name Primary?    Bipolar disorder, in partial remission, most recent episode depressed Yes         PROBLEM LIST AND MANAGEMENT PLANS      Continue Geodon at 20 mg twice daily        PRESCRIPTION DRUG MANAGEMENT  Compliance: yes  Side Effects: none  Regimen Adjustments: none    DIAGNOSTIC TESTING  none    Kasia Sandoval

## 2019-10-23 ENCOUNTER — TELEPHONE (OUTPATIENT)
Dept: PSYCHIATRY | Facility: CLINIC | Age: 29
End: 2019-10-23

## 2019-11-01 RX ORDER — ZIPRASIDONE HYDROCHLORIDE 20 MG/1
20 CAPSULE ORAL 2 TIMES DAILY
Qty: 60 CAPSULE | Refills: 0 | Status: SHIPPED | OUTPATIENT
Start: 2019-11-01 | End: 2020-01-10 | Stop reason: SDUPTHER

## 2019-11-14 ENCOUNTER — PATIENT MESSAGE (OUTPATIENT)
Dept: PSYCHIATRY | Facility: CLINIC | Age: 29
End: 2019-11-14

## 2020-01-10 RX ORDER — ZIPRASIDONE HYDROCHLORIDE 20 MG/1
20 CAPSULE ORAL 2 TIMES DAILY
Qty: 60 CAPSULE | Refills: 0 | Status: SHIPPED | OUTPATIENT
Start: 2020-01-10 | End: 2020-02-03 | Stop reason: SDUPTHER

## 2020-01-31 ENCOUNTER — PATIENT MESSAGE (OUTPATIENT)
Dept: PSYCHIATRY | Facility: CLINIC | Age: 30
End: 2020-01-31

## 2020-02-03 RX ORDER — ZIPRASIDONE HYDROCHLORIDE 20 MG/1
20 CAPSULE ORAL 2 TIMES DAILY
Qty: 60 CAPSULE | Refills: 0 | Status: SHIPPED | OUTPATIENT
Start: 2020-02-03 | End: 2020-04-02 | Stop reason: SDUPTHER

## 2020-04-01 ENCOUNTER — PATIENT MESSAGE (OUTPATIENT)
Dept: PSYCHIATRY | Facility: CLINIC | Age: 30
End: 2020-04-01

## 2020-04-02 RX ORDER — ZIPRASIDONE HYDROCHLORIDE 20 MG/1
20 CAPSULE ORAL 2 TIMES DAILY
Qty: 60 CAPSULE | Refills: 0 | Status: SHIPPED | OUTPATIENT
Start: 2020-04-02 | End: 2020-07-31

## 2020-05-12 ENCOUNTER — OFFICE VISIT (OUTPATIENT)
Dept: PSYCHIATRY | Facility: CLINIC | Age: 30
End: 2020-05-12
Payer: MEDICAID

## 2020-05-12 DIAGNOSIS — F19.94: ICD-10-CM

## 2020-05-12 DIAGNOSIS — F11.21 OPIOID DEPENDENCE IN REMISSION: Primary | ICD-10-CM

## 2020-05-12 PROCEDURE — 99214 OFFICE O/P EST MOD 30 MIN: CPT | Mod: 95,,, | Performed by: PSYCHIATRY & NEUROLOGY

## 2020-05-12 PROCEDURE — 99214 PR OFFICE/OUTPT VISIT, EST, LEVL IV, 30-39 MIN: ICD-10-PCS | Mod: 95,,, | Performed by: PSYCHIATRY & NEUROLOGY

## 2020-05-12 NOTE — PROGRESS NOTES
ESTABLISHED OUTPATIENT VISIT   E/M LEVEL 4: [id number]    ENCOUNTER DATE: 6/1/2020  SITE: [facility], [facility]      HISTORY    CHIEF COMPLAINT   Obed Gardner is a 30 y.o. male who presents for followup of unspecified bipolar disorder, and opiate use disorder, in early remission      HPI   Has  from his wife   Feels that this relationship is a healthy one and that he wants to see how he will do on medication  We discussed his diagnosis of bipolar disorder and the problems he had before with mood  However, he feels that at the time an unhealthy relationship, an unhealthy lifestyle: substance use and alcohol use contributed to his mood fluctuations  Also wonders if the drinking and drugs might have contributed to his manic symptoms and paranoia  We discussed his previous symptoms, and the fact that since he started the medication he has been able to do well   However, he feels that this coincided with lifestyle changes  Also, does not like the side effects of weight gain and also lethargy in the morning  Currently no depression, motivation is good, work is going well  Currently no paranoia       ROS   Pertinent symptoms in HPI      PFSH  Past Medical History reviewed: Yes  Family History reviewed: Yes  Social History reviewed: Yes    Currently maintains that he has not resumed using opiates  No alcohol    Allergies:   Review of patient's allergies indicates:  No Known Allergies     PSYCHOTROPIC MEDICATIONS   Current Outpatient Medications on File Prior to Visit   Medication Sig Dispense Refill    ziprasidone (GEODON) 20 MG Cap Take 1 capsule (20 mg total) by mouth 2 (two) times daily. 60 capsule 0     No current facility-administered medications on file prior to visit.        EXAMINATION    [unfilled]  There were no vitals filed for this visit.  Wt Readings from Last 1 Encounters:   08/05/19 98.8 kg (217 lb 13 oz)         Constitutional  General:   Well groomed, age appropriate       Musculoskeletal  Gait & Station:   non ataxic      Psychiatric  Speech:   clear and coherent, regular rate and rhythm   Mood & Affect:  Mood: euthymic  Affect: broad   Thought Process:  Linear, logical, goal directed   Thought Content:  Denies any paranoid thinking   Insight:  fair   Judgement: intact   Orientation:  Oriented to time, place, person, and situation   Memory: Intact based on conversations, not formally tested    Language: Intact   Attention Span & Concentration:  Intact to conversation.    Fund of Knowledge:  Adequate for age and education level       MEDICAL DECISION MAKING    Encounter Diagnoses   Name Primary?   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  Taper off Geodon under supervision  Decrease Geodon to 20 mg once a day for two weeks, and then 20 mg every other day for 2 weeks and then stop.  rtc in 4 weeks   Will taper off geodon under supervision   Will contact provider if any changes in mood.           PRESCRIPTION DRUG MANAGEMENT  Compliance: yes  Side Effects: none  Regimen Adjustments: none    DIAGNOSTIC TESTING  adhd testing     Kasia Sandoval

## 2020-06-01 PROBLEM — F19.94: Status: ACTIVE | Noted: 2020-06-01

## 2023-08-24 ENCOUNTER — HOSPITAL ENCOUNTER (EMERGENCY)
Facility: HOSPITAL | Age: 33
Discharge: HOME | End: 2023-08-25
Attending: EMERGENCY MEDICINE
Payer: COMMERCIAL

## 2023-08-24 DIAGNOSIS — F12.90 MARIJUANA USE: Primary | ICD-10-CM

## 2023-08-24 DIAGNOSIS — R00.0 TACHYCARDIA: ICD-10-CM

## 2023-08-24 DIAGNOSIS — F41.0 PANIC ANXIETY SYNDROME: ICD-10-CM

## 2023-08-24 LAB
ALBUMIN SERPL BCP-MCNC: 4.2 G/DL (ref 3.5–5.2)
ALP SERPL-CCNC: 59 U/L (ref 55–135)
ALT SERPL W/O P-5'-P-CCNC: 26 U/L (ref 10–44)
ANION GAP SERPL CALC-SCNC: 11 MMOL/L (ref 8–16)
AST SERPL-CCNC: 25 U/L (ref 10–40)
BASOPHILS # BLD AUTO: 0.03 K/UL (ref 0–0.2)
BASOPHILS NFR BLD: 0.4 % (ref 0–1.9)
BILIRUB SERPL-MCNC: 0.6 MG/DL (ref 0.1–1)
BUN SERPL-MCNC: 17 MG/DL (ref 6–20)
CALCIUM SERPL-MCNC: 9 MG/DL (ref 8.7–10.5)
CHLORIDE SERPL-SCNC: 109 MMOL/L (ref 95–110)
CK SERPL-CCNC: 218 U/L (ref 20–200)
CO2 SERPL-SCNC: 22 MMOL/L (ref 23–29)
CREAT SERPL-MCNC: 1.1 MG/DL (ref 0.5–1.4)
DIFFERENTIAL METHOD: NORMAL
EOSINOPHIL # BLD AUTO: 0 K/UL (ref 0–0.5)
EOSINOPHIL NFR BLD: 0.4 % (ref 0–8)
ERYTHROCYTE [DISTWIDTH] IN BLOOD BY AUTOMATED COUNT: 11.6 % (ref 11.5–14.5)
EST. GFR  (NO RACE VARIABLE): >60 ML/MIN/1.73 M^2
GLUCOSE SERPL-MCNC: 177 MG/DL (ref 70–110)
HCT VFR BLD AUTO: 43.3 % (ref 40–54)
HCV AB SERPL QL IA: NEGATIVE
HEP C VIRUS HOLD SPECIMEN: NORMAL
HGB BLD-MCNC: 14.8 G/DL (ref 14–18)
HIV 1+2 AB+HIV1 P24 AG SERPL QL IA: NEGATIVE
IMM GRANULOCYTES # BLD AUTO: 0 K/UL (ref 0–0.04)
IMM GRANULOCYTES NFR BLD AUTO: 0 % (ref 0–0.5)
LYMPHOCYTES # BLD AUTO: 2.9 K/UL (ref 1–4.8)
LYMPHOCYTES NFR BLD: 39.5 % (ref 18–48)
MCH RBC QN AUTO: 30.3 PG (ref 27–31)
MCHC RBC AUTO-ENTMCNC: 34.2 G/DL (ref 32–36)
MCV RBC AUTO: 89 FL (ref 82–98)
MONOCYTES # BLD AUTO: 0.6 K/UL (ref 0.3–1)
MONOCYTES NFR BLD: 7.7 % (ref 4–15)
NEUTROPHILS # BLD AUTO: 3.9 K/UL (ref 1.8–7.7)
NEUTROPHILS NFR BLD: 52 % (ref 38–73)
NRBC BLD-RTO: 0 /100 WBC
PLATELET # BLD AUTO: 272 K/UL (ref 150–450)
PMV BLD AUTO: 9.3 FL (ref 9.2–12.9)
POTASSIUM SERPL-SCNC: 3.2 MMOL/L (ref 3.5–5.1)
PROT SERPL-MCNC: 7.3 G/DL (ref 6–8.4)
RBC # BLD AUTO: 4.89 M/UL (ref 4.6–6.2)
SODIUM SERPL-SCNC: 142 MMOL/L (ref 136–145)
TROPONIN I SERPL DL<=0.01 NG/ML-MCNC: <0.006 NG/ML (ref 0–0.03)
WBC # BLD AUTO: 7.42 K/UL (ref 3.9–12.7)

## 2023-08-24 PROCEDURE — 81003 URINALYSIS AUTO W/O SCOPE: CPT | Mod: 59 | Performed by: EMERGENCY MEDICINE

## 2023-08-24 PROCEDURE — 85379 FIBRIN DEGRADATION QUANT: CPT | Performed by: EMERGENCY MEDICINE

## 2023-08-24 PROCEDURE — 93010 EKG 12-LEAD: ICD-10-PCS | Mod: ,,, | Performed by: INTERNAL MEDICINE

## 2023-08-24 PROCEDURE — 80307 DRUG TEST PRSMV CHEM ANLYZR: CPT | Performed by: EMERGENCY MEDICINE

## 2023-08-24 PROCEDURE — 82550 ASSAY OF CK (CPK): CPT | Performed by: EMERGENCY MEDICINE

## 2023-08-24 PROCEDURE — 99285 EMERGENCY DEPT VISIT HI MDM: CPT | Mod: 25

## 2023-08-24 PROCEDURE — 93010 ELECTROCARDIOGRAM REPORT: CPT | Mod: ,,, | Performed by: INTERNAL MEDICINE

## 2023-08-24 PROCEDURE — 80053 COMPREHEN METABOLIC PANEL: CPT | Performed by: EMERGENCY MEDICINE

## 2023-08-24 PROCEDURE — 36415 COLL VENOUS BLD VENIPUNCTURE: CPT | Performed by: EMERGENCY MEDICINE

## 2023-08-24 PROCEDURE — 84484 ASSAY OF TROPONIN QUANT: CPT | Performed by: EMERGENCY MEDICINE

## 2023-08-24 PROCEDURE — 25000003 PHARM REV CODE 250: Performed by: EMERGENCY MEDICINE

## 2023-08-24 PROCEDURE — 86803 HEPATITIS C AB TEST: CPT | Performed by: EMERGENCY MEDICINE

## 2023-08-24 PROCEDURE — 85025 COMPLETE CBC W/AUTO DIFF WBC: CPT | Performed by: EMERGENCY MEDICINE

## 2023-08-24 PROCEDURE — 93005 ELECTROCARDIOGRAM TRACING: CPT

## 2023-08-24 PROCEDURE — 96360 HYDRATION IV INFUSION INIT: CPT

## 2023-08-24 PROCEDURE — 87389 HIV-1 AG W/HIV-1&-2 AB AG IA: CPT | Performed by: EMERGENCY MEDICINE

## 2023-08-24 RX ADMIN — SODIUM CHLORIDE 1000 ML: 9 INJECTION, SOLUTION INTRAVENOUS at 10:08

## 2023-08-25 VITALS
HEIGHT: 61 IN | HEART RATE: 85 BPM | SYSTOLIC BLOOD PRESSURE: 137 MMHG | RESPIRATION RATE: 16 BRPM | BODY MASS INDEX: 41.16 KG/M2 | DIASTOLIC BLOOD PRESSURE: 68 MMHG | OXYGEN SATURATION: 96 % | TEMPERATURE: 98 F

## 2023-08-25 LAB
AMPHET+METHAMPHET UR QL: NEGATIVE
BARBITURATES UR QL SCN>200 NG/ML: NEGATIVE
BENZODIAZ UR QL SCN>200 NG/ML: NEGATIVE
BILIRUB UR QL STRIP: NEGATIVE
BZE UR QL SCN: NEGATIVE
CANNABINOIDS UR QL SCN: ABNORMAL
CLARITY UR: CLEAR
COLOR UR: YELLOW
CREAT UR-MCNC: 249.1 MG/DL (ref 23–375)
D DIMER PPP IA.FEU-MCNC: <0.19 MG/L FEU
GLUCOSE UR QL STRIP: NEGATIVE
HGB UR QL STRIP: NEGATIVE
KETONES UR QL STRIP: NEGATIVE
LEUKOCYTE ESTERASE UR QL STRIP: NEGATIVE
METHADONE UR QL SCN>300 NG/ML: NEGATIVE
NITRITE UR QL STRIP: NEGATIVE
OPIATES UR QL SCN: NEGATIVE
PCP UR QL SCN>25 NG/ML: NEGATIVE
PH UR STRIP: 6 [PH] (ref 5–8)
PROT UR QL STRIP: ABNORMAL
SP GR UR STRIP: >1.03 (ref 1–1.03)
TOXICOLOGY INFORMATION: ABNORMAL
URN SPEC COLLECT METH UR: ABNORMAL
UROBILINOGEN UR STRIP-ACNC: NEGATIVE EU/DL

## 2023-08-25 NOTE — ED PROVIDER NOTES
SCRIBE #1 NOTE: I, Sanjuanita Garcia, am scribing for, and in the presence of, Ben Marie MD. I have scribed the entire note.       History     Chief Complaint   Patient presents with    Addiction Problem     Pt to ED via [facility] having anxiety after smoking marijuana. Pt very jittery and was uncooperative enroute.     Review of patient's allergies indicates:  No Known Allergies      History of Present Illness     HPI    8/24/2023, 10:44 PM  History is limited secondary to the patient's acuity of condition  Majority of the history obtained from independent historian: patient's wife      History of Present Illness: Obed Gardner is a 33 y.o. male patient with a PMHx of ADHD, anxiety, and depression who presents to the Emergency Department via [facility] for evaluation of an addiction problem. The pt reports CP after smoking marijuana and is concerned of a heart attack. The pt is somnolent and appears anxious on initial evaluation  The pt's wife reports that she called the paramedics after he went into the bedroom c/o CP and stating that he felt like was dying. She states that his lips became pale and that he went in and out of consciousness. Symptoms are constant and moderate in severity. No mitigating or exacerbating factors reported. Associated sxs include right foot pain. The pt's wife reports that this is a chronic issue and suspects joint pain as the pt is flat footed. The pt had surgery for an ingrown toenail on the right foot 1 month ago. No prior Tx reported. No further complaints or concerns at this time.       Arrival mode: [facility]    PCP: No, Primary Doctor        Past Medical History:  Past Medical History:   Diagnosis Date    ADHD     Depression        Past Surgical History:  No past surgical history on file.      Family History:  Family History   Problem Relation Age of Onset    Drug abuse Brother        Social History:  Social History     Tobacco Use    Smoking status: Every Day     Current packs/day: 2.00      Types: Cigarettes    Smokeless tobacco: Never   Substance and Sexual Activity    Alcohol use: No    Drug use: Yes     Types: Cocaine, Marijuana     Comment: opiates    Sexual activity: Yes     Partners: Female        Review of Systems     Review of Systems   Unable to perform ROS: Acuity of condition   Cardiovascular:  Positive for chest pain.   Musculoskeletal:  Positive for arthralgias (right foot).   Psychiatric/Behavioral:  Positive for decreased concentration (somnolent). The patient is nervous/anxious.       Physical Exam     Initial Vitals [08/24/23 2222]   BP Pulse Resp Temp SpO2   133/88 (!) 115 18 97.7 °F (36.5 °C) 96 %      MAP       --          Physical Exam  Nursing Notes and Vital Signs Reviewed.  Constitutional: Patient is in no acute distress. Well-developed and well-nourished.  Head: Atraumatic. Normocephalic.  Eyes: PERRL. EOM intact. Conjunctivae are not pale. No scleral icterus.  ENT: Mucous membranes are moist. Oropharynx is clear and symmetric.    Neck: Supple. Full ROM. No lymphadenopathy.  Cardiovascular: Regular rate. Regular rhythm. No murmurs, rubs, or gallops. Distal pulses are 2+ and symmetric.  Pulmonary/Chest: No respiratory distress. Clear to auscultation bilaterally. No wheezing or rales.  Abdominal: Soft and non-distended.  There is no tenderness.  No rebound, guarding, or rigidity. Good bowel sounds.  Genitourinary: No CVA tenderness  Musculoskeletal: Moves all extremities. No obvious deformities. No edema. No calf tenderness. Right foot tenderness.   Skin: Warm and dry.  Neurological:  Somnolent.  Normal speech.  No acute focal neurological deficits are appreciated.  Psychiatric: Anxious. Poor eye contact.      ED Course   Procedures  ED Vital Signs:  Vitals:    08/24/23 2222 08/24/23 2245 08/24/23 2249 08/24/23 2300   BP: 133/88 (!) 161/92  (!) 148/83   Pulse: (!) 115 (!) 120 (!) 113 (!) 121   Resp: 18 (!) 23  16   Temp: 97.7 °F (36.5 °C)      TempSrc: Oral      SpO2: 96% 98%   "97%   Height: 5' 1" (1.549 m)       08/24/23 2345 08/25/23 0000 08/25/23 0015 08/25/23 0030   BP: (!) 141/90 135/85 129/79 133/77   Pulse: 107 106 97 97   Resp: 18 (!) 22 16 16   Temp:       TempSrc:       SpO2: 97% 98% 97% 97%   Height:        08/25/23 0100 08/25/23 0150   BP: 135/71 137/68   Pulse: 87 85   Resp: 16 16   Temp:  98.2 °F (36.8 °C)   TempSrc:     SpO2: 97% 96%   Height:         Abnormal Lab Results:  Labs Reviewed   URINALYSIS, REFLEX TO URINE CULTURE - Abnormal; Notable for the following components:       Result Value    Specific Gravity, UA >1.030 (*)     Protein, UA Trace (*)     All other components within normal limits    Narrative:     Specimen Source->Urine   DRUG SCREEN PANEL, URINE EMERGENCY - Abnormal; Notable for the following components:    THC Presumptive Positive (*)     All other components within normal limits    Narrative:     Specimen Source->Urine   CK - Abnormal; Notable for the following components:     (*)     All other components within normal limits   COMPREHENSIVE METABOLIC PANEL - Abnormal; Notable for the following components:    Potassium 3.2 (*)     CO2 22 (*)     Glucose 177 (*)     All other components within normal limits   HIV 1 / 2 ANTIBODY    Narrative:     Release to patient->Immediate   HEPATITIS C ANTIBODY    Narrative:     Release to patient->Immediate   HEP C VIRUS HOLD SPECIMEN    Narrative:     Release to patient->Immediate   TROPONIN I   D DIMER, QUANTITATIVE   CBC W/ AUTO DIFFERENTIAL   COMPREHENSIVE METABOLIC PANEL   CK   CBC W/ AUTO DIFFERENTIAL        All Lab Results:  Results for orders placed or performed during the hospital encounter of 08/24/23   HIV 1/2 Ag/Ab (4th Gen)   Result Value Ref Range    HIV 1/2 Ag/Ab Negative Negative   Hepatitis C Antibody   Result Value Ref Range    Hepatitis C Ab Negative Negative   HCV Virus Hold Specimen   Result Value Ref Range    HEP C Virus Hold Specimen Hold for HCV sendout    Troponin I   Result Value Ref " Range    Troponin I <0.006 0.000 - 0.026 ng/mL   Urinalysis, Reflex to Urine Culture Urine, Clean Catch    Specimen: Urine   Result Value Ref Range    Specimen UA Urine, Clean Catch     Color, UA Yellow Yellow, Straw, Kathleen    Appearance, UA Clear Clear    pH, UA 6.0 5.0 - 8.0    Specific Gravity, UA >1.030 (A) 1.005 - 1.030    Protein, UA Trace (A) Negative    Glucose, UA Negative Negative    Ketones, UA Negative Negative    Bilirubin (UA) Negative Negative    Occult Blood UA Negative Negative    Nitrite, UA Negative Negative    Urobilinogen, UA Negative <2.0 EU/dL    Leukocytes, UA Negative Negative   Drug screen panel, emergency   Result Value Ref Range    Benzodiazepines Negative Negative    Methadone metabolites Negative Negative    Cocaine (Metab.) Negative Negative    Opiate Scrn, Ur Negative Negative    Barbiturate Screen, Ur Negative Negative    Amphetamine Screen, Ur Negative Negative    THC Presumptive Positive (A) Negative    Phencyclidine Negative Negative    Creatinine, Urine 249.1 23.0 - 375.0 mg/dL    Toxicology Information SEE COMMENT    D dimer, quantitative   Result Value Ref Range    D-Dimer <0.19 <0.50 mg/L FEU   CBC Auto Differential   Result Value Ref Range    WBC 7.42 3.90 - 12.70 K/uL    RBC 4.89 4.60 - 6.20 M/uL    Hemoglobin 14.8 14.0 - 18.0 g/dL    Hematocrit 43.3 40.0 - 54.0 %    MCV 89 82 - 98 fL    MCH 30.3 27.0 - 31.0 pg    MCHC 34.2 32.0 - 36.0 g/dL    RDW 11.6 11.5 - 14.5 %    Platelets 272 150 - 450 K/uL    MPV 9.3 9.2 - 12.9 fL    Immature Granulocytes 0.0 0.0 - 0.5 %    Gran # (ANC) 3.9 1.8 - 7.7 K/uL    Immature Grans (Abs) 0.00 0.00 - 0.04 K/uL    Lymph # 2.9 1.0 - 4.8 K/uL    Mono # 0.6 0.3 - 1.0 K/uL    Eos # 0.0 0.0 - 0.5 K/uL    Baso # 0.03 0.00 - 0.20 K/uL    nRBC 0 0 /100 WBC    Gran % 52.0 38.0 - 73.0 %    Lymph % 39.5 18.0 - 48.0 %    Mono % 7.7 4.0 - 15.0 %    Eosinophil % 0.4 0.0 - 8.0 %    Basophil % 0.4 0.0 - 1.9 %    Differential Method Automated    CK   Result  Value Ref Range     (H) 20 - 200 U/L   Comprehensive Metabolic Panel   Result Value Ref Range    Sodium 142 136 - 145 mmol/L    Potassium 3.2 (L) 3.5 - 5.1 mmol/L    Chloride 109 95 - 110 mmol/L    CO2 22 (L) 23 - 29 mmol/L    Glucose 177 (H) 70 - 110 mg/dL    BUN 17 6 - 20 mg/dL    Creatinine 1.1 0.5 - 1.4 mg/dL    Calcium 9.0 8.7 - 10.5 mg/dL    Total Protein 7.3 6.0 - 8.4 g/dL    Albumin 4.2 3.5 - 5.2 g/dL    Total Bilirubin 0.6 0.1 - 1.0 mg/dL    Alkaline Phosphatase 59 55 - 135 U/L    AST 25 10 - 40 U/L    ALT 26 10 - 44 U/L    eGFR >60 >60 mL/min/1.73 m^2    Anion Gap 11 8 - 16 mmol/L         Imaging Results:  Imaging Results              X-Ray Chest AP Portable (Final result)  Result time 08/25/23 00:18:11      Final result by Sandra Shafer MD (08/25/23 00:18:11)                   Impression:      Underinflation      Electronically signed by: Sandra Shafer  Date:    08/25/2023  Time:    00:18               Narrative:    EXAMINATION:  XR CHEST AP PORTABLE    CLINICAL HISTORY:  Tachycardia, unspecified    TECHNIQUE:  Single frontal portable view of the chest was performed.    COMPARISON:  Two thousand thirteen    FINDINGS:  Lungs underinflated without consolidation.  Mediastinal contour normal                                       The EKG was ordered, reviewed, and independently interpreted by the ED provider.  Interpretation time: 23:58  Rate: 98 BPM  Rhythm: normal sinus rhythm  Interpretation: No acute ST changes. No STEMI.           The Emergency Provider reviewed the vital signs and test results, which are outlined above.     ED Discussion     1:43 AM: Reassessed pt at this time.  The pt is more awake, alert, and oriented at this time. He states that he is ready to go home. Discussed with pt all pertinent ED information and results. Discussed pt dx and plan of tx. Gave pt all f/u and return to the ED instructions. All questions and concerns were addressed at this time. Pt expresses  understanding of information and instructions, and is comfortable with plan to discharge. Pt is stable for discharge.    I discussed with patient and/or family/caretaker that evaluation in the ED does not suggest any emergent or life threatening medical conditions requiring immediate intervention beyond what was provided in the ED, and I believe patient is safe for discharge.  Regardless, an unremarkable evaluation in the ED does not preclude the development or presence of a serious of life threatening condition. As such, patient was instructed to return immediately for any worsening or change in current symptoms.         Medical Decision Making  Amount and/or Complexity of Data Reviewed  Labs: ordered. Decision-making details documented in ED Course.  Radiology: ordered. Decision-making details documented in ED Course.  ECG/medicine tests: ordered. Decision-making details documented in ED Course.                ED Medication(s):  Medications   sodium chloride 0.9% bolus 1,000 mL 1,000 mL (0 mLs Intravenous Stopped 8/24/23 5756)       Discharge Medication List as of 8/25/2023  1:45 AM           Follow-up Information       [facility] In 3 days.    Contact information:  [address] [facility] [address] [phone]               [facility] - Emergency Dept..    Specialty: Emergency Medicine  Why: As needed, If symptoms worsen  Contact information:  [address] [facility] [address] [phone]                               Scribe Attestation:   Scribe #1: I performed the above scribed service and the documentation accurately describes the services I performed. I attest to the accuracy of the note.     Attending:   Physician Attestation Statement for Scribe #1: I, Ben Marie MD, personally performed the services described in this documentation, as scribed by Sanjuanita Garcia, in my presence, and it is both accurate and complete.           Clinical Impression        ICD-10-CM ICD-9-CM   1. Marijuana use  F12.90 305.20   2. Tachycardia  R00.0 785.0   3. Panic anxiety syndrome  F41.0 300.01       Disposition:   Disposition: Discharged  Condition: Stable         Ben Marie MD  08/25/23 2431